# Patient Record
Sex: FEMALE | Race: WHITE | Employment: FULL TIME | URBAN - METROPOLITAN AREA
[De-identification: names, ages, dates, MRNs, and addresses within clinical notes are randomized per-mention and may not be internally consistent; named-entity substitution may affect disease eponyms.]

---

## 2017-03-21 ENCOUNTER — OFFICE VISIT (OUTPATIENT)
Dept: SURGERY | Age: 42
End: 2017-03-21

## 2017-03-21 VITALS
HEART RATE: 74 BPM | WEIGHT: 118 LBS | OXYGEN SATURATION: 99 % | SYSTOLIC BLOOD PRESSURE: 136 MMHG | DIASTOLIC BLOOD PRESSURE: 86 MMHG | RESPIRATION RATE: 20 BRPM | BODY MASS INDEX: 20.14 KG/M2 | HEIGHT: 64 IN

## 2017-03-21 DIAGNOSIS — R10.31 GROIN PAIN, RIGHT: Primary | ICD-10-CM

## 2017-03-21 RX ORDER — IBUPROFEN 600 MG/1
600 TABLET ORAL
Qty: 21 TAB | Refills: 0 | Status: SHIPPED | OUTPATIENT
Start: 2017-03-21 | End: 2019-01-25 | Stop reason: DRUGHIGH

## 2017-03-28 ENCOUNTER — TELEPHONE (OUTPATIENT)
Dept: SURGERY | Age: 42
End: 2017-03-28

## 2017-04-04 NOTE — TELEPHONE ENCOUNTER
Spoke to Mrs. David Canas. .She is using the moist heat and Advil. She reports she is having pain  From the groin across the top of her thigh and hip(sometimes it is shooting). Will get back with her in the next few days after discussing with Dr. Kaden Wallis. Patient fine with plan.

## 2017-04-10 DIAGNOSIS — K40.90 RIGHT INGUINAL HERNIA: Primary | ICD-10-CM

## 2017-04-10 NOTE — TELEPHONE ENCOUNTER
Spoke to patient. Dr. Kaden Wallis wants ultrasound done at [facility]. Apt is scheduled for 4/12. Dr. Kaden Wallis will call her next week with results. Due to him out of town. Patient is fine with plan.

## 2017-04-28 NOTE — PROGRESS NOTES
To: Shanel Sibley MD    From: Scotty Acharya MD    Thank you for sending Bonnie Meyer to see us. Encounter Date: 3/21/2017  History and Physical    Assessment:   No evidence of hernia. Possible tender lymph node. Body mass index is 20.25 kg/(m^2). Plan:   Ibuprofen 600mg (4 over-the-counter tablets) three times daily with food. Ice area 20 minutes at a time as often as possible. Can do 20 minutes every hour. Refrain from exercise. May walk, but nothing more. Do no lift>10lbs. Follow this regimen for 2 weeks. Call us in 2 weeks to let us know if:  1. Its all better -- ease back into exercise. Avoid abdominal work-outs another month. 2. Its almost better -- refrain from exercise another month. 3. Its no better -- call to have groin ultrasound arranged. 4. Its worse -- call to make follow-up appointment. HPI:   Delbert Verma is a 36 y.o. female who is seen in consultation at the request of Shanel Sibley MD for groin pain. Symptoms were first noted 3 weeks ago. Pain is described as sharp and at times severe. Patient has a bulge. Bulge is not reducible. Patient does not have urinary symptoms. Patient does not have difficulty with bowel movements. Patient does not have nausea or vomiting. Patient has history of previous hernia surgery -- [place].      Past Medical History:   Diagnosis Date    Diabetes ([address]     Erythema nodosum     Thyroid disease     hyper, now hypo per pt     Past Surgical History:   Procedure Laterality Date    HX CARPAL TUNNEL RELEASE      bilateral    HX GYN      endometrial ablation    HX HEENT      right eye surgery    HX HERNIA REPAIR      left       Family History   Problem Relation Age of Onset    Thyroid Disease Mother     Arthritis-osteo Father     Asthma Son       Social History   Substance Use Topics    Smoking status: Never Smoker    Smokeless tobacco: Not on file    Alcohol use No      Current Outpatient Prescriptions   Medication Sig    ibuprofen (MOTRIN) 600 mg tablet Take 1 Tab by mouth three (3) times daily (with meals).  gabapentin (NEURONTIN) 300 mg tablet Take 300 mg by mouth nightly.  insulin lispro (HUMALOG) 100 unit/mL injection by SubCUTAneous route. Basil rate:  MN-7a-0.6units/hr  7a-1430pm-0.625units/hr  9781-9300-0.35units/hr  1830-MN-0.6units/hr    levothyroxine (SYNTHROID) 137 mcg tablet Take 150 mcg by mouth every other day.  PV W-O BRYAN/FERROUS FUMARATE/FA (M-VIT PO) Take 1 Tab by mouth daily. No current facility-administered medications for this visit. Allergies: Allergies   Allergen Reactions    Codeine Other (comments)     Pass out    Pcn [Penicillins] Unknown (comments)    Sulfa (Sulfonamide Antibiotics) Rash        Review of Systems:  10 systems reviewed. See scanned sheet in \"Media\" section. See HPI for pertinent positives and negatives. Objective:     Visit Vitals    /86    Pulse 74    Resp 20    Ht 5' 4\" (1.626 m)    Wt 53.5 kg (118 lb)    SpO2 99%    BMI 20.25 kg/m2       Physical Exam:  General appearance  Alert, cooperative, no distress, appears stated age   HEENT Anicteric   Neck Supple   Back   No CVA tenderness   Lungs   Clear to auscultation bilaterally   Heart  Regular rate and rhythm. No murmur, rub or gallop   Abdomen   Soft. Bowel sounds normal. No organomegaly. Tender LN on US. No hernia.      Extremities no cyanosis or edema   Pulses 2+ right radial   Skin Skin color, texture, turgor normal.   Lymph nodes Inguinal nodes normal.   Neurologic Without overt sensory or motor deficit     Signed By: Jass Montero MD     April 28, 2017

## 2017-08-21 ENCOUNTER — HOSPITAL ENCOUNTER (OUTPATIENT)
Dept: MAMMOGRAPHY | Age: 42
Discharge: HOME | End: 2017-08-21
Attending: OBSTETRICS & GYNECOLOGY
Payer: COMMERCIAL

## 2017-08-21 DIAGNOSIS — Z12.31 VISIT FOR SCREENING MAMMOGRAM: ICD-10-CM

## 2017-08-21 PROCEDURE — 77063 BREAST TOMOSYNTHESIS BI: CPT

## 2017-08-23 NOTE — TELEPHONE ENCOUNTER
Following up on patient care. Patient was scheduled form ultrasound 4/12. On 4/8/17 patient cancelled ultrasound. Per  patient will call back if she wants to persue.

## 2017-12-13 ENCOUNTER — HOSPITAL ENCOUNTER (OUTPATIENT)
Dept: ULTRASOUND IMAGING | Age: 42
Discharge: HOME | End: 2017-12-13
Payer: COMMERCIAL

## 2017-12-13 ENCOUNTER — HOSPITAL ENCOUNTER (OUTPATIENT)
Dept: MAMMOGRAPHY | Age: 42
Discharge: HOME | End: 2017-12-13
Payer: COMMERCIAL

## 2017-12-13 DIAGNOSIS — N63.20 MASS OF BREAST, LEFT: ICD-10-CM

## 2017-12-13 PROCEDURE — 76642 ULTRASOUND BREAST LIMITED: CPT

## 2017-12-13 PROCEDURE — 77065 DX MAMMO INCL CAD UNI: CPT

## 2017-12-14 NOTE — PROGRESS NOTES
Called coordination of care and spoke with Ibeth, per pt. Request to change time on her appt. From 12/21/2017 @ 1330 to 12/21/2017 @ 0930 a.m.- Changed in system per Ibeth and called pt. And left message on pt.'s phone [phone]) of the change-Ok'd per pt. To leave message on answering system.

## 2017-12-21 ENCOUNTER — HOSPITAL ENCOUNTER (OUTPATIENT)
Dept: ULTRASOUND IMAGING | Age: 42
Discharge: HOME | End: 2017-12-21
Attending: OBSTETRICS & GYNECOLOGY
Payer: COMMERCIAL

## 2017-12-21 VITALS
HEIGHT: 64 IN | RESPIRATION RATE: 20 BRPM | SYSTOLIC BLOOD PRESSURE: 136 MMHG | DIASTOLIC BLOOD PRESSURE: 74 MMHG | TEMPERATURE: 98 F | HEART RATE: 83 BPM | BODY MASS INDEX: 20.14 KG/M2 | WEIGHT: 118 LBS | OXYGEN SATURATION: 99 %

## 2017-12-21 DIAGNOSIS — N63.0 LUMP OR MASS IN BREAST: ICD-10-CM

## 2017-12-21 PROCEDURE — 19000 PUNCTURE ASPIR CYST BREAST: CPT

## 2017-12-21 NOTE — PROGRESS NOTES
Pt ambulated to U/S dept. Without difficulty accomp. By nurse. Pt positioned supine onto U/S stretcher.

## 2017-12-21 NOTE — ROUTINE PROCESS
I have reviewed preprinted discharge instructions with the patient. The patient verbalized understanding. Copy of discharge instructions given to pt., reviewed with pt., signature sheet signed and sent to [facility]. Rec. For scanning r/t penpad not working. Pt stable without c/o pain. Dressing left outer upper breast noted clean/dry/intact without bleeding, swelling or pain noted. Ice pack placed over cyst aspiration site prior to pt. Being discharged. Pt dressed self and ambulated to car for discharge without difficulty.

## 2019-01-25 ENCOUNTER — OFFICE VISIT (OUTPATIENT)
Dept: PRIMARY CARE CLINIC | Age: 44
End: 2019-01-25

## 2019-01-25 VITALS
WEIGHT: 124.2 LBS | TEMPERATURE: 97.9 F | HEART RATE: 77 BPM | HEIGHT: 64 IN | SYSTOLIC BLOOD PRESSURE: 123 MMHG | DIASTOLIC BLOOD PRESSURE: 80 MMHG | OXYGEN SATURATION: 98 % | BODY MASS INDEX: 21.21 KG/M2 | RESPIRATION RATE: 17 BRPM

## 2019-01-25 DIAGNOSIS — S53.401A ELBOW SPRAIN, RIGHT, INITIAL ENCOUNTER: Primary | ICD-10-CM

## 2019-01-25 RX ORDER — IBUPROFEN 600 MG/1
600 TABLET ORAL
Qty: 30 TAB | Refills: 0 | Status: SHIPPED | OUTPATIENT
Start: 2019-01-25 | End: 2020-07-29 | Stop reason: ALTCHOICE

## 2019-01-25 NOTE — PROGRESS NOTES
Chief Complaint   Patient presents with    Elbow Pain     she is a 39y.o. year old female who presents for evalution. She states she was lifting a heavy box down from a shelf at work about 1 week ago, and the box was heavier than she anticipated. The box was about eye level. When the full weight of the box was on her she felt a sharp pain in her right elbow area. There was no redness or swelling, however the area was painful and she had limited ROM. She did not report this as a work injury. I have discussed at length the importance of reporting the injury, however at this time she declines to do so. She has tried to rest the elbow for the past week, however it is still painful at times with full extension. She has tried ibuprofen with temporary relief only. Reviewed PmHx, RxHx, FmHx, SocHx, AllgHx and updated and dated in the chart. Review of Systems - negative except as listed above in the HPI    Objective:     Vitals:    01/25/19 1533   BP: 123/80   Pulse: 77   Resp: 17   Temp: 97.9 °F (36.6 °C)   TempSrc: Oral   SpO2: 98%   Weight: 124 lb 3.2 oz (56.3 kg)   Height: 5' 4\" (1.626 m)       Current Outpatient Medications   Medication Sig    ibuprofen (MOTRIN) 600 mg tablet Take 1 Tab by mouth every six (6) hours as needed for Pain.  gabapentin (NEURONTIN) 300 mg tablet Take 300 mg by mouth nightly.  insulin lispro (HUMALOG) 100 unit/mL injection by SubCUTAneous route. Basil rate:  MN-7a-0.6units/hr  7a-1430pm-0.625units/hr  3986-6264-0.35units/hr  1830-MN-0.6units/hr    levothyroxine (SYNTHROID) 137 mcg tablet Take 150 mcg by mouth every other day.  PV W-O BRYAN/FERROUS FUMARATE/FA (M-VIT PO) Take 1 Tab by mouth daily. No current facility-administered medications for this visit.         Physical Examination: General appearance - alert, well appearing, and in no distress  Mental status - alert, oriented to person, place, and time  Chest - clear to auscultation, no wheezes, rales or rhonchi, symmetric air entry  Heart - normal rate, regular rhythm, normal S1, S2, no murmurs, rubs, clicks or gallops  Musculoskeletal - abnormal exam of right elbow with pain on full extension and lifting of any weight. There is no bruising, swelling, erythema or obvious dislocation of the elbow area. Extremities - peripheral pulses normal, no pedal edema, no clubbing or cyanosis      Assessment/ Plan:    right elbow sprain    Ice, elevate, rest, avoid any lifting and take motrin 600 mg TID for discomfort. If pain continues, see ortho for further evaluation. Follow-up Disposition:  Return if symptoms worsen or fail to improve. I have discussed the diagnosis with the patient and the intended plan as seen in the above orders. The patient has received an after-visit summary and questions were answered concerning future plans. Pt conveyed understanding of plan.     Medication Side Effects and Warnings were discussed with patient      Jorge Alberto Dave NP

## 2019-01-25 NOTE — PROGRESS NOTES
Chief Complaint   Patient presents with    Elbow Pain     Pt c/o right elbow pain x 1 week. Pt has taken Aleve for discomfort.

## 2019-01-25 NOTE — PATIENT INSTRUCTIONS
Elbow Sprain: Care Instructions  Your Care Instructions    An elbow sprain occurs when you overstretch or tear the ligaments around your elbow. Ligaments are the tough tissues that connect one bone to another. A sprain can happen when you fall or when you play sports or do chores around the house. Most sprains will heal with some treatment at home. Follow-up care is a key part of your treatment and safety. Be sure to make and go to all appointments, and call your doctor if you are having problems. It's also a good idea to know your test results and keep a list of the medicines you take. How can you care for yourself at home? · Follow your doctor's directions for wearing a splint, elbow pad, sling, or elastic bandage. Wrapping the elbow may help reduce or prevent swelling. · Rest and protect your elbow. Do not do any activity that hurts your elbow. · Apply ice or a cold pack to your elbow for 10 to 20 minutes at a time to reduce swelling. Try this every 1 to 2 hours for 3 days (when you are awake) or until the swelling goes down. Put a thin cloth between the ice and your skin. · After 2 or 3 days, if your swelling is gone, apply a heating pad on low or a warm cloth to your elbow. This helps keep your arm flexible. Some doctors suggest that you go back and forth between hot and cold. Keep the splint dry. · Prop up your elbow on pillows while you apply ice or anytime you sit or lie down. Try to keep the elbow at or above the level of your heart to help reduce swelling. · Take pain medicines exactly as directed. ? If the doctor gave you a prescription medicine for pain, take it as prescribed. ? If you are not taking a prescription pain medicine, ask your doctor if you can take an over-the-counter medicine. · Return to your usual level of activity slowly. When should you call for help?   Call your doctor now or seek immediate medical care if:    · Your pain is worse.     · You have new or increased swelling in your elbow or hand.     · You cannot bend your arm.     · You have a fever.     · Your elbow looks red.     · You have tingling, weakness, or numbness in your elbow, hand, or fingers.    Watch closely for changes in your health, and be sure to contact your doctor if:    · Your pain is not better after 2 weeks. Where can you learn more? Go to http://janette-mary.info/. Enter Z822 in the search box to learn more about \"Elbow Sprain: Care Instructions. \"  Current as of: September 20, 2018  Content Version: 11.9  © 7582-2868 [employer]. Care instructions adapted under license by [employer] (which disclaims liability or warranty for this information). If you have questions about a medical condition or this instruction, always ask your healthcare professional. [address] any warranty or liability for your use of this information.

## 2019-08-20 ENCOUNTER — HOSPITAL ENCOUNTER (OUTPATIENT)
Dept: MAMMOGRAPHY | Age: 44
Discharge: NURSING, REHAB OR LONG-TERM CARE | End: 2019-08-20
Attending: OBSTETRICS & GYNECOLOGY
Payer: COMMERCIAL

## 2019-08-20 DIAGNOSIS — Z12.39 SCREENING BREAST EXAMINATION: ICD-10-CM

## 2019-08-20 PROCEDURE — 77067 SCR MAMMO BI INCL CAD: CPT

## 2019-08-20 PROCEDURE — 77063 BREAST TOMOSYNTHESIS BI: CPT

## 2020-07-29 ENCOUNTER — OFFICE VISIT (OUTPATIENT)
Dept: PRIMARY CARE CLINIC | Age: 45
End: 2020-07-29

## 2020-07-29 VITALS
SYSTOLIC BLOOD PRESSURE: 133 MMHG | BODY MASS INDEX: 20.88 KG/M2 | HEART RATE: 113 BPM | OXYGEN SATURATION: 99 % | WEIGHT: 122.3 LBS | DIASTOLIC BLOOD PRESSURE: 86 MMHG | HEIGHT: 64 IN | TEMPERATURE: 98.3 F | RESPIRATION RATE: 16 BRPM

## 2020-07-29 DIAGNOSIS — H60.391 OTHER INFECTIVE ACUTE OTITIS EXTERNA OF RIGHT EAR: Primary | ICD-10-CM

## 2020-07-29 DIAGNOSIS — H92.11 EAR DRAINAGE RIGHT: ICD-10-CM

## 2020-07-29 RX ORDER — BISMUTH SUBSALICYLATE 262 MG
1 TABLET,CHEWABLE ORAL DAILY
COMMUNITY

## 2020-07-29 RX ORDER — OFLOXACIN 3 MG/ML
5 SOLUTION AURICULAR (OTIC) 2 TIMES DAILY
Qty: 5 ML | Refills: 0 | Status: SHIPPED | OUTPATIENT
Start: 2020-07-29 | End: 2020-08-05

## 2020-07-29 RX ORDER — MELATONIN
4000 DAILY
COMMUNITY

## 2020-07-29 NOTE — PROGRESS NOTES
HISTORY OF PRESENT ILLNESS  Nithin Bradley is a 54 y.o. female. Patient presents with right ear pain and drainage x3-4 days. Reports pillow is saturated with what was initially clear drainage is now thick yellow. Literally \"drips out of ear. \"  Denies dizziness, fever, hearing loss, trauma. No recent antibiotic use. Was swimming 1 weeks ago. Does have a history of ear tubes. Ear Pain   The history is provided by the patient. The problem has been gradually worsening. Pertinent negatives include no abdominal pain and no headaches. Nothing aggravates the symptoms. Nothing relieves the symptoms. She has tried nothing for the symptoms. Past Medical History:   Diagnosis Date    Breast lump     left breast   2 weeks    Diabetes ([address]     Erythema nodosum     Thyroid disease     hyper, now hypo per pt     Past Surgical History:   Procedure Laterality Date    HX CARPAL TUNNEL RELEASE      bilateral    HX GYN      endometrial ablation    HX HEENT      right eye surgery    HX HERNIA REPAIR      left     MITZY STEREO  BX BREAST RT 1ST LESION W/CLIP AND SPECIMEN Right 2012    negative     Allergies   Allergen Reactions    Codeine Other (comments)     Pass out    Pcn [Penicillins] Unknown (comments)    Sulfa (Sulfonamide Antibiotics) Rash       Review of Systems   Constitutional: Negative for fever and malaise/fatigue. HENT: Negative for sinus pain, sore throat and tinnitus. Eyes: Negative for discharge and redness. Respiratory: Negative for cough. Gastrointestinal: Negative for abdominal pain. Musculoskeletal: Negative for myalgias and neck pain. Neurological: Negative for dizziness and headaches. Physical Exam  Vitals signs reviewed. Constitutional:       Appearance: Normal appearance. HENT:      Head: Normocephalic and atraumatic. Right Ear: Hearing, tympanic membrane and ear canal normal.      Left Ear: Hearing normal. Drainage, swelling and tenderness present.       Ears: Comments: Culture obtained of yellow drainage. Unable to visualize TM for rupture. Tenderness tragus and mastoid  Eyes:      Pupils: Pupils are equal, round, and reactive to light. Neck:      Musculoskeletal: Normal range of motion. Cardiovascular:      Rate and Rhythm: Regular rhythm. Tachycardia present. Pulses: Normal pulses. Pulmonary:      Effort: Pulmonary effort is normal.   Skin:     General: Skin is warm. Neurological:      General: No focal deficit present. Mental Status: She is alert. Psychiatric:         Mood and Affect: Mood normal.         ASSESSMENT and PLAN    ICD-10-CM ICD-9-CM    1. Other infective acute otitis externa of right ear  H60.391 380.10 CULTURE, BODY FLUID W GRAM STAIN   2. Ear drainage right  H92.11 388.60 CULTURE, BODY FLUID W [place]     Encounter Diagnoses   Name Primary?  Other infective acute otitis externa of right ear Yes    Ear drainage right      Orders Placed This Encounter    CULTURE, BODY FLUID W GRAM STAIN    insulin pump (PATIENT SUPPLIED) misc    multivitamin (ONE A DAY) tablet    cholecalciferol (VITAMIN D3) (1000 Units /25 mcg) tablet    ofloxacin (FLOXIN) 0.3 % otic solution   Return 5-7 days if drainage continues so we can visualize for TM rupture  Medication as directed  I have discussed with the patient the diagnosis  and intended plan as seen in the orders. Patient received AVS , all questions answered concerning all future plans. I have discussed medication side effects and warnings with the patient/ guardian. Patient instructed to return of see PCPf symptoms worsen or fail to improve. It was a pleasure to see you in the office today. Please call if you have any further questions or concerns. I am available through the portal system. Signed By: GERRI Chang     July 29, 2020      This note will not be viewable in [address]

## 2020-07-29 NOTE — PATIENT INSTRUCTIONS

## 2020-07-29 NOTE — PROGRESS NOTES
Chief Complaint   Patient presents with    Ear Pain     Patient in room #4 with complaints of pain in right ear with drainage, clear to yellowish

## 2020-07-31 LAB
BACTERIA SPEC AEROBE CULT: ABNORMAL
SPECIMEN STATUS REPORT, ROLRST: NORMAL

## 2020-08-06 ENCOUNTER — OFFICE VISIT (OUTPATIENT)
Dept: PRIMARY CARE CLINIC | Age: 45
End: 2020-08-06
Payer: COMMERCIAL

## 2020-08-06 VITALS
OXYGEN SATURATION: 99 % | RESPIRATION RATE: 15 BRPM | HEIGHT: 64 IN | BODY MASS INDEX: 20.83 KG/M2 | SYSTOLIC BLOOD PRESSURE: 124 MMHG | DIASTOLIC BLOOD PRESSURE: 73 MMHG | WEIGHT: 122 LBS | HEART RATE: 82 BPM | TEMPERATURE: 98.8 F

## 2020-08-06 DIAGNOSIS — H93.8X1 SENSATION OF FULLNESS IN RIGHT EAR: ICD-10-CM

## 2020-08-06 DIAGNOSIS — H60.391 INFECTIVE OTITIS EXTERNA OF RIGHT EAR: ICD-10-CM

## 2020-08-06 PROCEDURE — 99213 OFFICE O/P EST LOW 20 MIN: CPT | Performed by: NURSE PRACTITIONER

## 2020-08-06 RX ORDER — BISMUTH SUBSALICYLATE 262 MG
TABLET,CHEWABLE ORAL
COMMUNITY
Start: 2013-06-17 | End: 2020-08-06

## 2020-08-06 RX ORDER — DOXYCYCLINE 100 MG/1
100 CAPSULE ORAL 2 TIMES DAILY
Qty: 20 CAP | Refills: 0 | Status: SHIPPED | OUTPATIENT
Start: 2020-08-06 | End: 2020-08-16

## 2020-08-06 NOTE — PATIENT INSTRUCTIONS

## 2020-08-06 NOTE — PROGRESS NOTES
Bebo Honeycutt is a 54 y.o. female    Room:4    Chief Complaint   Patient presents with    Ear Drainage     Pt states\" i just want to have my eae checked again i was here last week, right ear, was put on oflaxcin for seven days the drainage stopped but now it feels so clogged that i cant hear out of it, it was still draining through tuesday of this week\". Visit Vitals  /73 (BP 1 Location: Left arm, BP Patient Position: Sitting)   Pulse 82   Temp 98.8 °F (37.1 °C) (Oral)   Resp 15   Ht 5' 4\" (1.626 m)   Wt 122 lb (55.3 kg)   SpO2 99%   BMI 20.94 kg/m²       Pain Scale: 0 - No pain/10    1. Have you been to the ER, urgent care clinic since your last visit? Hospitalized since your last visit?no    2. Have you seen or consulted any other health care providers outside of the [address] [place] [address] since your last visit? Include any pap smears or colon screening.  No

## 2020-08-07 NOTE — PROGRESS NOTES
Subjective:   Andrea Caldera is a 54 y.o. female who complains of right ear fullness following treatment of acute otitis externa last week. She completed course of ofloxacin. Drainage stopped few days ago. Denies any pain. Culture of drainage grew staph aureus and was sensitive to ofloxacin. History of ear issues. Tubes twice - once as child and once as adult. Not currently under care of ENT. Relevant PMH:   Past Medical History:   Diagnosis Date    Breast lump     left breast   2 weeks    Diabetes ([address]     Erythema nodosum     Thyroid disease     hyper, now hypo per pt     Past Surgical History:   Procedure Laterality Date    HX CARPAL TUNNEL RELEASE      bilateral    HX GYN      endometrial ablation    HX HEENT      right eye surgery    HX HERNIA REPAIR      left     MITZY STEREO  BX BREAST RT 1ST LESION W/CLIP AND SPECIMEN Right 2012    negative     Allergies   Allergen Reactions    Codeine Other (comments)     Pass out    Pcn [Penicillins] Unknown (comments)    Sulfa (Sulfonamide Antibiotics) Rash         Review of Systems  Pertinent items are noted in HPI. Objective:     Visit Vitals  /73 (BP 1 Location: Left arm, BP Patient Position: Sitting)   Pulse 82   Temp 98.8 °F (37.1 °C) (Oral)   Resp 15   Ht 5' 4\" (1.626 m)   Wt 122 lb (55.3 kg)   LMP 07/18/2020 (Exact Date)   SpO2 99%   BMI 20.94 kg/m²     General:  alert, cooperative, no distress   Eyes: negative   Ears: lLeft external canal and TM normal.  Right external canal appears normal and TM is gray, intact but distorted. Sinuses: Normal paranasal sinuses without tenderness   Mouth:  Lips, mucosa, and tongue normal. Teeth and gums normal and normal findings: oropharynx pink & moist without lesions or evidence of thrush   Neck: supple, symmetrical, trachea midline and no adenopathy. Assessment/Plan:       ICD-10-CM ICD-9-CM    1. Infective otitis externa of right ear  H60.391 380.10    2.  Sensation of fullness in right ear H93.8X1 388.8        Orders Placed This Encounter    multivitamin (Daily Multi-Vitamin) tablet    omega-3s/dha/epa/fish oil/D3 (VITAMIN-D + OMEGA-3 PO)    doxycycline (MONODOX) 100 mg capsule       Per orders. F/u with ENT ([address] [facility] or hers) if persistent or worsening symptoms. Min Bee NP  This note will not be viewable in [address]

## 2020-08-21 ENCOUNTER — HOSPITAL ENCOUNTER (OUTPATIENT)
Dept: MAMMOGRAPHY | Age: 45
Discharge: HOME | End: 2020-08-21
Attending: OBSTETRICS & GYNECOLOGY
Payer: COMMERCIAL

## 2020-08-21 DIAGNOSIS — Z12.31 VISIT FOR SCREENING MAMMOGRAM: ICD-10-CM

## 2020-08-21 PROCEDURE — 77063 BREAST TOMOSYNTHESIS BI: CPT

## 2021-08-09 ENCOUNTER — TRANSCRIBE ORDER (OUTPATIENT)
Dept: SCHEDULING | Age: 46
End: 2021-08-09

## 2021-08-09 DIAGNOSIS — Z12.31 VISIT FOR SCREENING MAMMOGRAM: Primary | ICD-10-CM

## 2021-08-24 ENCOUNTER — HOSPITAL ENCOUNTER (OUTPATIENT)
Dept: MAMMOGRAPHY | Age: 46
Discharge: HOME | End: 2021-08-24
Attending: OBSTETRICS & GYNECOLOGY
Payer: COMMERCIAL

## 2021-08-24 DIAGNOSIS — Z12.31 VISIT FOR SCREENING MAMMOGRAM: ICD-10-CM

## 2021-08-24 PROCEDURE — 77063 BREAST TOMOSYNTHESIS BI: CPT

## 2021-08-26 ENCOUNTER — HOSPITAL ENCOUNTER (OUTPATIENT)
Dept: MAMMOGRAPHY | Age: 46
Discharge: HOME | End: 2021-08-26
Attending: OBSTETRICS & GYNECOLOGY
Payer: COMMERCIAL

## 2021-08-26 DIAGNOSIS — R92.8 ABNORMAL MAMMOGRAM: ICD-10-CM

## 2021-08-26 PROCEDURE — 77061 BREAST TOMOSYNTHESIS UNI: CPT

## 2021-08-26 PROCEDURE — 76642 ULTRASOUND BREAST LIMITED: CPT

## 2021-09-15 ENCOUNTER — TRANSCRIBE ORDER (OUTPATIENT)
Dept: SCHEDULING | Age: 46
End: 2021-09-15

## 2021-09-15 DIAGNOSIS — R92.2 BREAST DENSITY: Primary | ICD-10-CM

## 2021-09-17 ENCOUNTER — HOSPITAL ENCOUNTER (OUTPATIENT)
Dept: MAMMOGRAPHY | Age: 46
Discharge: HOME | End: 2021-09-17
Attending: OBSTETRICS & GYNECOLOGY
Payer: COMMERCIAL

## 2021-09-17 DIAGNOSIS — R92.2 BREAST DENSITY: ICD-10-CM

## 2021-09-17 PROCEDURE — 19000 PUNCTURE ASPIR CYST BREAST: CPT

## 2021-09-17 PROCEDURE — 76642 ULTRASOUND BREAST LIMITED: CPT

## 2021-09-17 PROCEDURE — 74011000250 HC RX REV CODE- 250: Performed by: RADIOLOGY

## 2021-09-17 RX ORDER — LIDOCAINE HYDROCHLORIDE 10 MG/ML
10 INJECTION INFILTRATION; PERINEURAL
Status: COMPLETED | OUTPATIENT
Start: 2021-09-17 | End: 2021-09-17

## 2021-09-17 RX ADMIN — LIDOCAINE HYDROCHLORIDE 10 ML: 10 INJECTION, SOLUTION INFILTRATION; PERINEURAL at 14:05

## 2021-09-17 NOTE — PROGRESS NOTES
Following procedure, no bruising or bleeding. Pt given verbal discharge instructions to keep bandaid dry. She can remove it tonight. She was given an icepack to use if needed.

## 2021-10-21 ENCOUNTER — OFFICE VISIT (OUTPATIENT)
Dept: PRIMARY CARE CLINIC | Age: 46
End: 2021-10-21

## 2021-10-21 VITALS
TEMPERATURE: 98.1 F | BODY MASS INDEX: 20.32 KG/M2 | WEIGHT: 119 LBS | RESPIRATION RATE: 16 BRPM | OXYGEN SATURATION: 98 % | HEART RATE: 84 BPM | HEIGHT: 64 IN | DIASTOLIC BLOOD PRESSURE: 82 MMHG | SYSTOLIC BLOOD PRESSURE: 124 MMHG

## 2021-10-21 DIAGNOSIS — H66.91 RIGHT ACUTE OTITIS MEDIA: Primary | ICD-10-CM

## 2021-10-21 DIAGNOSIS — Z20.822 ENCOUNTER FOR LABORATORY TESTING FOR COVID-19 VIRUS: ICD-10-CM

## 2021-10-21 LAB — SARS-COV-2 POC: NEGATIVE

## 2021-10-21 PROCEDURE — 87426 SARSCOV CORONAVIRUS AG IA: CPT | Performed by: NURSE PRACTITIONER

## 2021-10-21 PROCEDURE — 99213 OFFICE O/P EST LOW 20 MIN: CPT | Performed by: NURSE PRACTITIONER

## 2021-10-21 RX ORDER — INSULIN ASPART 100 [IU]/ML
INJECTION, SOLUTION INTRAVENOUS; SUBCUTANEOUS
COMMUNITY
Start: 2021-09-03

## 2021-10-21 RX ORDER — AZITHROMYCIN 250 MG/1
TABLET, FILM COATED ORAL
Qty: 6 TABLET | Refills: 0 | Status: SHIPPED | OUTPATIENT
Start: 2021-10-21 | End: 2022-04-16 | Stop reason: ALTCHOICE

## 2021-10-21 NOTE — PROGRESS NOTES
RM 6    Chief Complaint   Patient presents with    Ear Pain     both ears x 2 days- right ear drainage       Visit Vitals  /82 (BP 1 Location: Right arm, BP Patient Position: Sitting)   Pulse 84   Temp 98.1 °F (36.7 °C) (Oral)   Resp 16   Ht 5' 4\" (1.626 m)   Wt 119 lb (54 kg)   SpO2 98%   BMI 20.43 kg/m²

## 2021-10-21 NOTE — PROGRESS NOTES
Subjective:   Guillermo Polk presents for evaluation of Ear Pain (both ears x 2 days- right ear drainage)     This started a few days ago, and is gradually worsening since that time. She also reports right ear pain, discharge, pressure. She reports crackling in left ear. She denies a history of: fever, sinus congestion, sore throat and dry cough. Treatments have included: none. Relevant PMH: No pertinent additional PMH. Patient reports sick contacts: no    /82 (BP 1 Location: Right arm, BP Patient Position: Sitting)   Pulse 84   Temp 98.1 °F (36.7 °C) (Oral)   Resp 16   Ht 5' 4\" (1.626 m)   Wt 119 lb (54 kg)   LMP 09/26/2021   SpO2 98%   BMI 20.43 kg/m²     Physical Exam  General: alert, cooperative, no distress, appears stated age  Eye exam: negative  Ear exam: abnormal external canal AD - erythematous, abnormal external canal AS - ceruminosis impacting canal  Sinus exam: Normal paranasal sinuses without tenderness  Oropharynx exam: Lips, mucosa, and tongue normal. Teeth and gums normal and normal findings: oropharynx pink & moist without lesions or evidence of thrush  Neck exam: supple, symmetrical, trachea midline and no adenopathy  Ear exam: correction: external canal AD normal; TM - erythematous, bulging, purulent material behind TM    Assessment/Plan:   1. Right acute otitis media  2. Encounter for laboratory testing for COVID-19 virus  -     AMB POC SARS-COV-2    Orders Placed This Encounter    AMB POC SARS-COV-2 ANTIGEN    NovoLOG U-100 Insulin aspart 100 unit/mL injection    azithromycin (ZITHROMAX) 250 mg tablet     Pt states PCN allergic and possible cephalosporin allergy. The above diagnosis is a new problem. We discussed expected course, resolution, and complications of diagnosis in detail. No follow-ups on file. An electronic signature was used to authenticate this note.   -- Raheel Grimes NP

## 2021-10-21 NOTE — PATIENT INSTRUCTIONS
Ear Infection (Otitis Media): Care Instructions  Overview     An ear infection may start with a cold and affect the middle ear (otitis media). It can hurt a lot. Most ear infections clear up on their own in a couple of days and do not need antibiotics. Also, antibiotics do not work against viruses, which may be the cause of your infection. Regular doses of pain relievers are the best way to reduce your fever and help you feel better. Follow-up care is a key part of your treatment and safety. Be sure to make and go to all appointments, and call your doctor if you are having problems. It's also a good idea to know your test results and keep a list of the medicines you take. How can you care for yourself at home? · Take pain medicines exactly as directed. ? If the doctor gave you a prescription medicine for pain, take it as prescribed. ? If you are not taking a prescription pain medicine, take an over-the-counter medicine, such as acetaminophen (Tylenol), ibuprofen (Advil, Motrin), or naproxen (Aleve). Read and follow all instructions on the label. ? Do not take two or more pain medicines at the same time unless the doctor told you to. Many pain medicines have acetaminophen, which is Tylenol. Too much acetaminophen (Tylenol) can be harmful. · Plan to take a full dose of pain reliever before bedtime. Getting enough sleep will help you get better. · Try a warm, moist washcloth on the ear. It may help relieve pain. · If your doctor prescribed antibiotics, take them as directed. Do not stop taking them just because you feel better. You need to take the full course of antibiotics. When should you call for help? Call your doctor now or seek immediate medical care if:    · You have new or increasing ear pain.     · You have new or increasing pus or blood draining from your ear.     · You have a fever with a stiff neck or a severe headache.    Watch closely for changes in your health, and be sure to contact your doctor if:    · You have new or worse symptoms.     · You are not getting better after taking an antibiotic for 2 days. Where can you learn more? Go to http://www.gray.com/  Enter [id number] in the search box to learn more about \"Ear Infection (Otitis Media): Care Instructions. \"  Current as of: December 2, 2020               Content Version: 13.0  © 2006-2021 [employer]. Care instructions adapted under license by [employer] (which disclaims liability or warranty for this information). If you have questions about a medical condition or this instruction, always ask your healthcare professional. [address] any warranty or liability for your use of this information.

## 2022-04-16 ENCOUNTER — OFFICE VISIT (OUTPATIENT)
Dept: PRIMARY CARE CLINIC | Age: 47
End: 2022-04-16

## 2022-04-16 VITALS
DIASTOLIC BLOOD PRESSURE: 85 MMHG | BODY MASS INDEX: 20.91 KG/M2 | TEMPERATURE: 98.1 F | WEIGHT: 118 LBS | HEART RATE: 95 BPM | SYSTOLIC BLOOD PRESSURE: 136 MMHG | HEIGHT: 63 IN | RESPIRATION RATE: 17 BRPM | OXYGEN SATURATION: 97 %

## 2022-04-16 DIAGNOSIS — L02.11 CUTANEOUS ABSCESS OF NECK: Primary | ICD-10-CM

## 2022-04-16 PROCEDURE — 99213 OFFICE O/P EST LOW 20 MIN: CPT | Performed by: NURSE PRACTITIONER

## 2022-04-16 RX ORDER — BACITRACIN ZINC 500 UNIT/G
OINTMENT (GRAM) TOPICAL 2 TIMES DAILY
Qty: 15 G | Refills: 0 | Status: SHIPPED | OUTPATIENT
Start: 2022-04-16

## 2022-04-16 RX ORDER — CEPHALEXIN 500 MG/1
500 CAPSULE ORAL 4 TIMES DAILY
Qty: 28 CAPSULE | Refills: 0 | Status: SHIPPED | OUTPATIENT
Start: 2022-04-16 | End: 2022-04-23

## 2022-04-16 NOTE — PATIENT INSTRUCTIONS
Skin Abscess: Care Instructions  Overview     A skin abscess is a bacterial infection that forms a pocket of pus. A boil is a kind of skin abscess. The doctor may have cut an opening in the abscess so that the pus can drain out. You may have gauze in the cut so that the abscess will stay open and keep draining. You may need antibiotics. You will need to follow up with your doctor to make sure the infection has gone away. The doctor has checked you carefully, but problems can develop later. If you notice any problems or new symptoms, get medical treatment right away. Follow-up care is a key part of your treatment and safety. Be sure to make and go to all appointments, and call your doctor if you are having problems. It's also a good idea to know your test results and keep a list of the medicines you take. How can you care for yourself at home? · Apply warm and dry compresses, a heating pad set on low, or a hot water bottle 3 or 4 times a day for pain. Keep a cloth between the heat source and your skin. · If your doctor prescribed antibiotics, take them as directed. Do not stop taking them just because you feel better. You need to take the full course of antibiotics. · Take pain medicines exactly as directed. ? If the doctor gave you a prescription medicine for pain, take it as prescribed. ? If you are not taking a prescription pain medicine, ask your doctor if you can take an over-the-counter medicine. · Keep your bandage clean and dry. Change the bandage whenever it gets wet or dirty, or at least one time a day. · If the abscess was packed with gauze:  ? Keep follow-up appointments to have the gauze changed or removed. If the doctor instructed you to remove the gauze, follow the instructions you were given for how to remove it. ? After the gauze is removed, soak the area in warm water for 15 to 20 minutes 2 times a day, until the wound closes. When should you call for help?    Call your doctor now or seek immediate medical care if:    · You have signs of worsening infection, such as:  ? Increased pain, swelling, warmth, or redness. ? Red streaks leading from the infected skin. ? Pus draining from the wound. ? A fever. Watch closely for changes in your health, and be sure to contact your doctor if:    · You do not get better as expected. Where can you learn more? Go to http://www.gray.com/  Enter X562 in the search box to learn more about \"Skin Abscess: Care Instructions. \"  Current as of: November 15, 2021               Content Version: 13.2  © 3284-3546 [employer]. Care instructions adapted under license by [employer] (which disclaims liability or warranty for this information). If you have questions about a medical condition or this instruction, always ask your healthcare professional. [address] any warranty or liability for your use of this information.

## 2022-04-16 NOTE — PROGRESS NOTES
HISTORY OF PRESENT ILLNESS  aHnnah Sutton is a 49 y.o. female. HPI  Chief Complaint   Patient presents with    Ear Drainage     behind right ear . .sunday hurting tender. monday lump. Savannah Lever during week progressed. hot compresses made it pop yellowish blood drainage and blood came out yesterday     Pt presents with complaints of painful bump behind right ear for 5 days. Started after hair color treatment. Bump spontaneously drainage some bloody pus. Feels well otherwise. Denies any fevers, chills, N/V, sore throat. Tried warm compresses with minimal relief. Past Medical History:   Diagnosis Date    Breast lump     left breast   2 weeks    Diabetes ([address]     Erythema nodosum     Thyroid disease     hyper, now hypo per pt     Past Surgical History:   Procedure Laterality Date    HX CARPAL TUNNEL RELEASE      bilateral    HX CYST INCISION AND DRAINAGE Left     age 35?  HX GYN      endometrial ablation    HX HEENT      right eye surgery    HX HERNIA REPAIR      left     MITZY STEREO  BX BREAST RT 1ST LESION W/CLIP AND SPECIMEN Right 2012    negative     Allergies   Allergen Reactions    Codeine Other (comments)     Pass out    Pcn [Penicillins] Unknown (comments)    Sulfa (Sulfonamide Antibiotics) Rash       ROS  A comprehensive review of systems was obtained and negative except findings in the HPI    Physical Exam  Constitutional:       General: She is not in acute distress. Appearance: Normal appearance. She is not ill-appearing or toxic-appearing. HENT:      Head:        Comments: <1cm abscess behind right ear, draining purulent fluid. Mild erythema surrounding abscess with tenderness. Nose: Nose normal.      Mouth/Throat:      Mouth: Mucous membranes are moist.      Pharynx: Oropharynx is clear. Eyes:      Extraocular Movements: Extraocular movements intact. Conjunctiva/sclera: Conjunctivae normal.      Pupils: Pupils are equal, round, and reactive to light.    Musculoskeletal: Cervical back: Normal range of motion and neck supple. No rigidity or tenderness. Neurological:      Mental Status: She is alert. ASSESSMENT and PLAN    ICD-10-CM ICD-9-CM    1. Cutaneous abscess of neck  L02.11 682.1      Orders Placed This Encounter    cephALEXin (KEFLEX) 500 mg capsule     Sig: Take 1 Capsule by mouth four (4) times daily for 7 days. Dispense:  28 Capsule     Refill:  0    bacitracin zinc (BACITRACIN) ointment     Sig: Apply  to affected area two (2) times a day. Dispense:  15 g     Refill:  0     Treatment per orders. Follow-up if no improvement and prn.     Rodney Arellano NP

## 2023-01-26 ENCOUNTER — TRANSCRIBE ORDER (OUTPATIENT)
Dept: SCHEDULING | Age: 48
End: 2023-01-26

## 2023-01-26 ENCOUNTER — HOSPITAL ENCOUNTER (OUTPATIENT)
Dept: CT IMAGING | Age: 48
Discharge: HOME | End: 2023-01-26
Attending: FAMILY MEDICINE
Payer: COMMERCIAL

## 2023-01-26 DIAGNOSIS — R51.9 INTRACTABLE HEADACHE: ICD-10-CM

## 2023-01-26 DIAGNOSIS — S09.90XA HEAD INJURY: ICD-10-CM

## 2023-01-26 DIAGNOSIS — R40.20 LOSS OF CONSCIOUSNESS (HCC): ICD-10-CM

## 2023-01-26 DIAGNOSIS — R40.20 LOSS OF CONSCIOUSNESS (HCC): Primary | ICD-10-CM

## 2023-01-26 PROCEDURE — 70450 CT HEAD/BRAIN W/O DYE: CPT

## 2023-02-17 ENCOUNTER — HOSPITAL ENCOUNTER (INPATIENT)
Age: 48
LOS: 1 days | Discharge: HOME | End: 2023-02-18
Attending: STUDENT IN AN ORGANIZED HEALTH CARE EDUCATION/TRAINING PROGRAM | Admitting: HOSPITALIST
Payer: COMMERCIAL

## 2023-02-17 ENCOUNTER — APPOINTMENT (OUTPATIENT)
Dept: CT IMAGING | Age: 48
End: 2023-02-17
Attending: STUDENT IN AN ORGANIZED HEALTH CARE EDUCATION/TRAINING PROGRAM
Payer: COMMERCIAL

## 2023-02-17 ENCOUNTER — APPOINTMENT (OUTPATIENT)
Dept: MRI IMAGING | Age: 48
End: 2023-02-17
Attending: HOSPITALIST
Payer: COMMERCIAL

## 2023-02-17 ENCOUNTER — APPOINTMENT (OUTPATIENT)
Dept: GENERAL RADIOLOGY | Age: 48
End: 2023-02-17
Attending: STUDENT IN AN ORGANIZED HEALTH CARE EDUCATION/TRAINING PROGRAM
Payer: COMMERCIAL

## 2023-02-17 DIAGNOSIS — R55 SYNCOPE AND COLLAPSE: ICD-10-CM

## 2023-02-17 DIAGNOSIS — R56.9 SEIZURE (HCC): Primary | ICD-10-CM

## 2023-02-17 DIAGNOSIS — R73.9 HYPERGLYCEMIA: ICD-10-CM

## 2023-02-17 LAB
ALBUMIN SERPL-MCNC: 4.5 G/DL (ref 3.5–5)
ALBUMIN/GLOB SERPL: 1.3 (ref 1.1–2.2)
ALP SERPL-CCNC: 89 U/L (ref 45–117)
ALT SERPL-CCNC: 22 U/L (ref 12–78)
ANION GAP SERPL CALC-SCNC: 5 MMOL/L (ref 5–15)
APPEARANCE UR: CLEAR
AST SERPL-CCNC: 12 U/L (ref 15–37)
ATRIAL RATE: 79 BPM
B-OH-BUTYR SERPL-SCNC: 0.92 MMOL/L
BACTERIA URNS QL MICRO: NEGATIVE /HPF
BASE EXCESS BLDV CALC-SCNC: 1 MMOL/L
BASOPHILS # BLD: 0 K/UL (ref 0–0.1)
BASOPHILS NFR BLD: 0 % (ref 0–1)
BILIRUB SERPL-MCNC: 0.7 MG/DL (ref 0.2–1)
BILIRUB UR QL: NEGATIVE
BUN SERPL-MCNC: 14 MG/DL (ref 6–20)
BUN/CREAT SERPL: 15 (ref 12–20)
CALCIUM SERPL-MCNC: 10 MG/DL (ref 8.5–10.1)
CALCULATED P AXIS, ECG09: 74 DEGREES
CALCULATED R AXIS, ECG10: 82 DEGREES
CALCULATED T AXIS, ECG11: 31 DEGREES
CHLORIDE SERPL-SCNC: 100 MMOL/L (ref 97–108)
CO2 SERPL-SCNC: 28 MMOL/L (ref 21–32)
COLOR UR: ABNORMAL
COMMENT, HOLDF: NORMAL
CREAT SERPL-MCNC: 0.95 MG/DL (ref 0.55–1.02)
DIAGNOSIS, 93000: NORMAL
DIFFERENTIAL METHOD BLD: ABNORMAL
EOSINOPHIL # BLD: 0.1 K/UL (ref 0–0.4)
EOSINOPHIL NFR BLD: 1 % (ref 0–7)
EPITH CASTS URNS QL MICRO: ABNORMAL /LPF
ERYTHROCYTE [DISTWIDTH] IN BLOOD BY AUTOMATED COUNT: 11.7 % (ref 11.5–14.5)
GLOBULIN SER CALC-MCNC: 3.6 G/DL (ref 2–4)
GLUCOSE BLD STRIP.AUTO-MCNC: 105 MG/DL (ref 65–117)
GLUCOSE BLD STRIP.AUTO-MCNC: 93 MG/DL (ref 65–117)
GLUCOSE SERPL-MCNC: 354 MG/DL (ref 65–100)
GLUCOSE UR STRIP.AUTO-MCNC: >1000 MG/DL
HCG UR QL: NEGATIVE
HCO3 BLDV-SCNC: 27.5 MMOL/L (ref 23–28)
HCT VFR BLD AUTO: 43.3 % (ref 35–47)
HGB BLD-MCNC: 14 G/DL (ref 11.5–16)
HGB UR QL STRIP: NEGATIVE
IMM GRANULOCYTES # BLD AUTO: 0.1 K/UL (ref 0–0.04)
IMM GRANULOCYTES NFR BLD AUTO: 1 % (ref 0–0.5)
KETONES UR QL STRIP.AUTO: 80 MG/DL
LEUKOCYTE ESTERASE UR QL STRIP.AUTO: NEGATIVE
LYMPHOCYTES # BLD: 0.6 K/UL (ref 0.8–3.5)
LYMPHOCYTES NFR BLD: 10 % (ref 12–49)
MAGNESIUM SERPL-MCNC: 2.1 MG/DL (ref 1.6–2.4)
MCH RBC QN AUTO: 30.5 PG (ref 26–34)
MCHC RBC AUTO-ENTMCNC: 32.3 G/DL (ref 30–36.5)
MCV RBC AUTO: 94.3 FL (ref 80–99)
MONOCYTES # BLD: 0.4 K/UL (ref 0–1)
MONOCYTES NFR BLD: 6 % (ref 5–13)
NEUTS SEG # BLD: 5.2 K/UL (ref 1.8–8)
NEUTS SEG NFR BLD: 82 % (ref 32–75)
NITRITE UR QL STRIP.AUTO: NEGATIVE
NRBC # BLD: 0 K/UL (ref 0–0.01)
NRBC BLD-RTO: 0 PER 100 WBC
P-R INTERVAL, ECG05: 126 MS
PCO2 BLDV: 50.2 MMHG (ref 41–51)
PH BLDV: 7.35 (ref 7.32–7.42)
PH UR STRIP: 5.5 (ref 5–8)
PLATELET # BLD AUTO: 289 K/UL (ref 150–400)
PMV BLD AUTO: 9.8 FL (ref 8.9–12.9)
PO2 BLDV: 33 MMHG (ref 25–40)
POTASSIUM SERPL-SCNC: 4.8 MMOL/L (ref 3.5–5.1)
PROLACTIN SERPL-MCNC: 15.2 NG/ML
PROT SERPL-MCNC: 8.1 G/DL (ref 6.4–8.2)
PROT UR STRIP-MCNC: NEGATIVE MG/DL
Q-T INTERVAL, ECG07: 356 MS
QRS DURATION, ECG06: 84 MS
QTC CALCULATION (BEZET), ECG08: 408 MS
RBC # BLD AUTO: 4.59 M/UL (ref 3.8–5.2)
RBC #/AREA URNS HPF: ABNORMAL /HPF (ref 0–5)
RBC MORPH BLD: ABNORMAL
SAMPLES BEING HELD,HOLD: NORMAL
SAO2 % BLDV: 58.3 % (ref 65–88)
SERVICE CMNT-IMP: ABNORMAL
SERVICE CMNT-IMP: NORMAL
SERVICE CMNT-IMP: NORMAL
SODIUM SERPL-SCNC: 133 MMOL/L (ref 136–145)
SP GR UR REFRACTOMETRY: 1.02 (ref 1–1.03)
SPECIMEN TYPE: ABNORMAL
TROPONIN I SERPL HS-MCNC: 25 NG/L (ref 0–37)
TSH SERPL DL<=0.05 MIU/L-ACNC: 0.42 UIU/ML (ref 0.36–3.74)
UR CULT HOLD, URHOLD: NORMAL
UROBILINOGEN UR QL STRIP.AUTO: 0.2 EU/DL (ref 0.2–1)
VENTRICULAR RATE, ECG03: 79 BPM
WBC # BLD AUTO: 6.4 K/UL (ref 3.6–11)
WBC URNS QL MICRO: ABNORMAL /HPF (ref 0–4)
YEAST BUDDING URNS QL: PRESENT
YEAST URNS QL MICRO: PRESENT

## 2023-02-17 PROCEDURE — 84146 ASSAY OF PROLACTIN: CPT

## 2023-02-17 PROCEDURE — 85025 COMPLETE CBC W/AUTO DIFF WBC: CPT

## 2023-02-17 PROCEDURE — 82010 KETONE BODYS QUAN: CPT

## 2023-02-17 PROCEDURE — 80053 COMPREHEN METABOLIC PANEL: CPT

## 2023-02-17 PROCEDURE — 84484 ASSAY OF TROPONIN QUANT: CPT

## 2023-02-17 PROCEDURE — 95816 EEG AWAKE AND DROWSY: CPT | Performed by: HOSPITALIST

## 2023-02-17 PROCEDURE — 74011250637 HC RX REV CODE- 250/637: Performed by: HOSPITALIST

## 2023-02-17 PROCEDURE — 74011250637 HC RX REV CODE- 250/637: Performed by: STUDENT IN AN ORGANIZED HEALTH CARE EDUCATION/TRAINING PROGRAM

## 2023-02-17 PROCEDURE — 99285 EMERGENCY DEPT VISIT HI MDM: CPT

## 2023-02-17 PROCEDURE — G0378 HOSPITAL OBSERVATION PER HR: HCPCS

## 2023-02-17 PROCEDURE — 95816 EEG AWAKE AND DROWSY: CPT | Performed by: PSYCHIATRY & NEUROLOGY

## 2023-02-17 PROCEDURE — 82803 BLOOD GASES ANY COMBINATION: CPT

## 2023-02-17 PROCEDURE — 81025 URINE PREGNANCY TEST: CPT

## 2023-02-17 PROCEDURE — 65270000046 HC RM TELEMETRY

## 2023-02-17 PROCEDURE — 70450 CT HEAD/BRAIN W/O DYE: CPT

## 2023-02-17 PROCEDURE — 82962 GLUCOSE BLOOD TEST: CPT

## 2023-02-17 PROCEDURE — 81001 URINALYSIS AUTO W/SCOPE: CPT

## 2023-02-17 PROCEDURE — 36415 COLL VENOUS BLD VENIPUNCTURE: CPT

## 2023-02-17 PROCEDURE — 84443 ASSAY THYROID STIM HORMONE: CPT

## 2023-02-17 PROCEDURE — 74011250637 HC RX REV CODE- 250/637: Performed by: NURSE PRACTITIONER

## 2023-02-17 PROCEDURE — 93005 ELECTROCARDIOGRAM TRACING: CPT

## 2023-02-17 PROCEDURE — 70551 MRI BRAIN STEM W/O DYE: CPT

## 2023-02-17 PROCEDURE — 74011250636 HC RX REV CODE- 250/636: Performed by: HOSPITALIST

## 2023-02-17 PROCEDURE — 83735 ASSAY OF MAGNESIUM: CPT

## 2023-02-17 PROCEDURE — 71046 X-RAY EXAM CHEST 2 VIEWS: CPT

## 2023-02-17 RX ORDER — ONDANSETRON 2 MG/ML
4 INJECTION INTRAMUSCULAR; INTRAVENOUS
Status: DISCONTINUED | OUTPATIENT
Start: 2023-02-17 | End: 2023-02-18 | Stop reason: HOSPADM

## 2023-02-17 RX ORDER — IBUPROFEN 200 MG
4 TABLET ORAL AS NEEDED
Status: DISCONTINUED | OUTPATIENT
Start: 2023-02-17 | End: 2023-02-18 | Stop reason: HOSPADM

## 2023-02-17 RX ORDER — LORAZEPAM 0.5 MG/1
1 TABLET ORAL
Status: COMPLETED | OUTPATIENT
Start: 2023-02-17 | End: 2023-02-17

## 2023-02-17 RX ORDER — ONDANSETRON 4 MG/1
4 TABLET, ORALLY DISINTEGRATING ORAL
Status: DISCONTINUED | OUTPATIENT
Start: 2023-02-17 | End: 2023-02-18 | Stop reason: HOSPADM

## 2023-02-17 RX ORDER — ACETAMINOPHEN 325 MG/1
650 TABLET ORAL
Status: COMPLETED | OUTPATIENT
Start: 2023-02-17 | End: 2023-02-17

## 2023-02-17 RX ORDER — GABAPENTIN 300 MG/1
300 CAPSULE ORAL
Status: DISCONTINUED | OUTPATIENT
Start: 2023-02-17 | End: 2023-02-18 | Stop reason: HOSPADM

## 2023-02-17 RX ORDER — SODIUM CHLORIDE 0.9 % (FLUSH) 0.9 %
5-40 SYRINGE (ML) INJECTION EVERY 8 HOURS
Status: DISCONTINUED | OUTPATIENT
Start: 2023-02-17 | End: 2023-02-18 | Stop reason: HOSPADM

## 2023-02-17 RX ORDER — INSULIN LISPRO 100 [IU]/ML
INJECTION, SOLUTION INTRAVENOUS; SUBCUTANEOUS
Status: DISCONTINUED | OUTPATIENT
Start: 2023-02-17 | End: 2023-02-18 | Stop reason: HOSPADM

## 2023-02-17 RX ORDER — ACETAMINOPHEN 650 MG/1
650 SUPPOSITORY RECTAL
Status: DISCONTINUED | OUTPATIENT
Start: 2023-02-17 | End: 2023-02-18 | Stop reason: HOSPADM

## 2023-02-17 RX ORDER — ACETAMINOPHEN 325 MG/1
650 TABLET ORAL
Status: DISCONTINUED | OUTPATIENT
Start: 2023-02-17 | End: 2023-02-18 | Stop reason: HOSPADM

## 2023-02-17 RX ORDER — SODIUM CHLORIDE 9 MG/ML
100 INJECTION, SOLUTION INTRAVENOUS CONTINUOUS
Status: DISPENSED | OUTPATIENT
Start: 2023-02-17 | End: 2023-02-18

## 2023-02-17 RX ORDER — SODIUM CHLORIDE 0.9 % (FLUSH) 0.9 %
5-40 SYRINGE (ML) INJECTION AS NEEDED
Status: DISCONTINUED | OUTPATIENT
Start: 2023-02-17 | End: 2023-02-18 | Stop reason: HOSPADM

## 2023-02-17 RX ORDER — LEVOTHYROXINE SODIUM 50 UG/1
125 TABLET ORAL EVERY OTHER DAY
Status: DISCONTINUED | OUTPATIENT
Start: 2023-02-17 | End: 2023-02-18 | Stop reason: HOSPADM

## 2023-02-17 RX ORDER — LISINOPRIL 5 MG/1
5 TABLET ORAL DAILY
COMMUNITY

## 2023-02-17 RX ORDER — AMITRIPTYLINE HYDROCHLORIDE 10 MG/1
10 TABLET, FILM COATED ORAL
COMMUNITY

## 2023-02-17 RX ORDER — POLYETHYLENE GLYCOL 3350 17 G/17G
17 POWDER, FOR SOLUTION ORAL DAILY PRN
Status: DISCONTINUED | OUTPATIENT
Start: 2023-02-17 | End: 2023-02-18 | Stop reason: HOSPADM

## 2023-02-17 RX ADMIN — GABAPENTIN 300 MG: 300 CAPSULE ORAL at 22:05

## 2023-02-17 RX ADMIN — ACETAMINOPHEN 650 MG: 325 TABLET ORAL at 16:36

## 2023-02-17 RX ADMIN — SODIUM CHLORIDE 100 ML/HR: 9 INJECTION, SOLUTION INTRAVENOUS at 21:15

## 2023-02-17 RX ADMIN — LORAZEPAM 1 MG: 0.5 TABLET ORAL at 21:39

## 2023-02-17 RX ADMIN — LEVOTHYROXINE SODIUM 125 MCG: 0.05 TABLET ORAL at 16:36

## 2023-02-17 NOTE — H&P
History and Physical    Date of Service:  2/17/2023  Primary Care Provider: Edwin Gonzalez MD  Source of information: The patient and Chart review    Chief Complaint: High Blood Sugar  Seizure-like activity    History of Presenting Illness:   Wendy Mcneill is a 49 y.o. female PMH significant for type 1 diabetes managed with insulin pump, hypothyroidism managed with levothyroxine, recently diagnosed hypertension recently started on lisinopril 5 mg daily, fall in January presented to the ED today for a seizure-like activity. The episode happened while her  was trying to change her insulin pump. Patient was standing by the bedside when she clenched up, became stiff and had urinary incontinence. No obvious frothing or tongue bite. No obvious tonic-clonic activity. No previous history of seizure. Following her recent fall she says she is diagnosed with concussion by her PCP. BG was 500 this morning, she had ice cream last night otherwise she has been in fact running hyperglycemic with BG in the 50s the last week. Head CT negative except possible Chiari malformation. Labs significant for hyperglycemia, anion gap 5, CO2 28. Mild hyponatremia sodium 133. High-sensitivity troponin normal.    The patient denies any headache, blurry vision, sore throat, trouble swallowing, trouble with speech, chest pain, SOB, cough, fever, chills, N/V/D, abd pain, urinary symptoms, constipation, recent travels, sick contacts, focal or generalized neurological symptoms, falls, injuries, rashes, contact with COVID-19 diagnosed patients, hematemesis, melena, hemoptysis, hematuria, rashes, denies starting any new medications and denies any other concerns or problems besides as mentioned above. REVIEW OF SYSTEMS:  A comprehensive review of systems was negative except for that written in the History of Present Illness.      Past Medical History:   Diagnosis Date    Breast lump     left breast   2 weeks Diabetes ([facility] [address]     Erythema nodosum     Thyroid disease     hyper, now hypo per pt      Past Surgical History:   Procedure Laterality Date    HX CARPAL TUNNEL RELEASE      bilateral    HX CYST INCISION AND DRAINAGE Left     age 35? HX GYN      endometrial ablation    HX HEENT      right eye surgery    HX HERNIA REPAIR      left     MITZY STEREO  BX BREAST RT 1ST LESION W/CLIP AND SPECIMEN Right 2012    negative     Prior to Admission medications    Medication Sig Start Date End Date Taking? Authorizing Provider   bacitracin zinc (BACITRACIN) ointment Apply  to affected area two (2) times a day. 4/16/22   Gerald Smiles, Paddy Kayser, NP   NovoLOG U-100 Insulin aspart 100 unit/mL injection INJECT 100 UNITS SUBCUTANEOUSLY ONCE A DAY USE WITH PUMP 9/3/21   Provider, Historical   omega-3s/dha/epa/fish oil/D3 (VITAMIN-D + OMEGA-3 PO) Vitamin D  Patient not taking: Reported on 10/21/2021    Provider, Historical   insulin pump (PATIENT SUPPLIED) misc by SubCUTAneous route as needed. Indications: Novolog SSI based on patient carb intake    Provider, Historical   multivitamin (ONE A DAY) tablet Take 1 Tab by mouth daily. Provider, Historical   cholecalciferol (VITAMIN D3) (1000 Units /25 mcg) tablet Take 4,000 Units by mouth daily. Provider, Historical   gabapentin (NEURONTIN) 300 mg tablet Take 300 mg by mouth nightly. Provider, Historical   levothyroxine (SYNTHROID) 137 mcg tablet Take 150 mcg by mouth every other day. Pt states she is taking 126mcg    Provider, Historical     Allergies   Allergen Reactions    Codeine Other (comments)     Pass out    Pcn [Penicillins] Unknown (comments)    Sulfa (Sulfonamide Antibiotics) Rash      Family History   Problem Relation Age of Onset    Thyroid Disease Mother     OSTEOARTHRITIS Father     Asthma Son       Social History:  reports that she has never smoked. She has never used smokeless tobacco. She reports that she does not drink alcohol and does not use drugs.    Social Determinants of Health     Tobacco Use: Low Risk     Smoking Tobacco Use: Never    Smokeless Tobacco Use: Never    Passive Exposure: Not on file   Alcohol Use: Not on file   Financial Resource Strain: Not on file   Food Insecurity: Not on file   Transportation Needs: Not on file   Physical Activity: Not on file   Stress: Not on file   Social Connections: Not on file   Intimate Partner Violence: Not on file   Depression: Not on file   Housing Stability: Not on file        Medications were reconciled to the best of my ability given all available resources at the time of admission. Route is PO if not otherwise noted. Family and social history were personally reviewed, all pertinent and relevant details are outlined as above. Objective:   Visit Vitals  /75   Pulse 88   Temp 97 °F (36.1 °C)   Resp 16   SpO2 99%      O2 Device: None (Room air)    PHYSICAL EXAM:   General: Patient is presently alert oriented x4. HEENT: PEERL, EOMI, moist mucus membranes  Neck: Supple, no JVD, no meningeal signs  Chest: Clear to auscultation bilaterally   CVS: RRR, S1 S2 heard, no murmurs/rubs/gallops  Abd: Soft, non-tender, non-distended, +bowel sounds   Ext: No clubbing, no cyanosis, no edema  Neuro/Psych: Alert orient x4. Nonfocal exam.  Pulses: 2+, symmetric in all extremities  Skin: Warm, dry, without rashes or lesions    Data Review:   I have independently reviewed and interpreted patient's lab and all other diagnostic data    Abnormal Labs Reviewed   CBC WITH AUTOMATED DIFF - Abnormal; Notable for the following components:       Result Value    NEUTROPHILS 82 (*)     LYMPHOCYTES 10 (*)     IMMATURE GRANULOCYTES 1 (*)     ABS. LYMPHOCYTES 0.6 (*)     ABS. IMM.  GRANS. 0.1 (*)     All other components within normal limits   METABOLIC PANEL, COMPREHENSIVE - Abnormal; Notable for the following components:    Sodium 133 (*)     Glucose 354 (*)     AST (SGOT) 12 (*)     All other components within normal limits   URINALYSIS W/MICROSCOPIC - Abnormal; Notable for the following components:    Glucose >1,000 (*)     Ketone 80 (*)     Epithelial cells MODERATE (*)     Yeast PRESENT (*)     Budding yeast PRESENT (*)     All other components within normal limits   BETA-HYDROXYBUTYRATE - Abnormal; Notable for the following components:    B-hydroxybutyrate 0.92 (*)     All other components within normal limits   POC VENOUS BLOOD GAS - Abnormal; Notable for the following components:    sO2, venous (POC) 58.3 (*)     All other components within normal limits       All Micro Results       Procedure Component Value Units Date/Time    URINE CULTURE HOLD SAMPLE [[id number]] Collected: 02/17/23 1140    Order Status: Completed Specimen: Urine Updated: 02/17/23 1144     Urine culture hold       Urine on hold in Microbiology dept for 2 days. If unpreserved urine is submitted, it cannot be used for addtional testing after 24 hours, recollection will be required. IMAGING:   CT HEAD WO CONT   Final Result   Borderline Chiari I malformation. Otherwise no acute abnormality            XR CHEST PA LAT   Final Result   1. No acute cardiopulmonary disease          MRI BRAIN WO CONT    (Results Pending)        ECG/ECHO:    Results for orders placed or performed during the hospital encounter of 02/17/23   EKG, 12 LEAD, INITIAL   Result Value Ref Range    Ventricular Rate 79 BPM    Atrial Rate 79 BPM    P-R Interval 126 ms    QRS Duration 84 ms    Q-T Interval 356 ms    QTC Calculation (Bezet) 408 ms    Calculated P Axis 74 degrees    Calculated R Axis 82 degrees    Calculated T Axis 31 degrees    Diagnosis       Normal sinus rhythm  Right atrial enlargement  Borderline ECG  No previous ECGs available  Confirmed by Tina Pa MD. ([id number]) on 2/17/2023 11:46:39 AM            Notes reviewed from all clinical/nonclinical/nursing services involved in patient's clinical care.  Care coordination discussions were held with appropriate clinical/nonclinical/ nursing providers based on care coordination needs. Assessment:   Given the patient's current clinical presentation, there is a high level of concern for decompensation if discharged from the emergency department. Complex decision making was performed, which includes reviewing the patient's available past medical records, laboratory results, and imaging studies. Active Problems:    Hyperglycemia (2/17/2023)      Seizure-like activity ([address] (2/17/2023)        Plan:   -Admit to the neuro unit. Rumson seizure precautions. Rumson aspiration precautions. Obtain EEG, brain MRI. Consult neurology. Hold off AEDs until seen by neurology. Type II DM on insulin pump  -Patient prefers to continue with her insulin pump  -We will consult DTC  -Order Accu-Cheks, Humalog, hypoglycemic protocol    Hypothyroidism: Continue levothyroxine, recently lowered to 125 mcg  Recently diagnosed hypertension. Reviewed home records, her BP is normal mostly except occasional spikes. I wonder if BP spikes are situational.  Will hold off antihypertensives for now. Continue gabapentin. She takes amitriptyline 10 mg nightly as needed sparingly        DIET: ADULT DIET Regular; 4 carb choices (60 gm/meal)   ISOLATION PRECAUTIONS: There are currently no Active Isolations  CODE STATUS: Full Code   DVT PROPHYLAXIS: SCDs  FUNCTIONAL STATUS PRIOR TO HOSPITALIZATION: Fully active and ambulatory; able to carry on all self-care without restriction. Ambulatory status/function: By self   EARLY MOBILITY ASSESSMENT: Recommend routine ambulation while hospitalized with the assistance of nursing staff  ANTICIPATED DISCHARGE: 24-48 hours.   ANTICIPATED DISPOSITION: Home  EMERGENCY CONTACT/SURROGATE DECISION MAKER:   Extended Emergency Contact Information  Primary Emergency Contact: Ronn Hayes  Address: [address] [place] [address], [address] [place] [address] Phone: [phone]  Relation: Spouse      CRITICAL CARE WAS PERFORMED FOR THIS ENCOUNTER: NO.      Signed By: Rosmery Colvin MD     February 17, 2023         Please note that this dictation may have been completed with Dragon, the computer voice recognition software. Quite often unanticipated grammatical, syntax, homophones, and other interpretive errors are inadvertently transcribed by the computer software. Please disregard these errors. Please excuse any errors that have escaped final proofreading.

## 2023-02-17 NOTE — ED PROVIDER NOTES
54-year-old female with a history of hypertension, diabetes, insulin-dependent who presents to the ED for evaluation of possible seizure happening earlier this morning. Patient reported not feeling well had a blood glucose measurement over 400. Patient was having her infusion device changed when she fell into the bed but this was witnessed by her . He reports that she had a 1 minute tonic-clonic activity and became stiff incontinent and urinated on herself. She denies any fevers. She did have some nausea this morning, denies any vomiting or diarrhea loose stools. Denies pregnancy. No history of seizures in the past.  Patient does have a history of possible TBI about a month ago and had a syncopal episode at that time. Currently being worked up outpatient for cardiac syncope is post to have a Holter monitor. High Blood Sugar   Associated symptoms include nausea. Pertinent negatives include no fever, no vomiting and no chest pain. Past Medical History:   Diagnosis Date    Breast lump     left breast   2 weeks    Diabetes (HCC)     Erythema nodosum     Thyroid disease     hyper, now hypo per pt       Past Surgical History:   Procedure Laterality Date    HX CARPAL TUNNEL RELEASE      bilateral    HX CYST INCISION AND DRAINAGE Left     age 35?     HX GYN      endometrial ablation    HX HEENT      right eye surgery    HX HERNIA REPAIR      left     MITZY STEREO  BX BREAST RT 1ST LESION W/CLIP AND SPECIMEN Right 2012    negative         Family History:   Problem Relation Age of Onset    Thyroid Disease Mother     OSTEOARTHRITIS Father     Asthma Son        Social History     Socioeconomic History    Marital status:      Spouse name: Not on file    Number of children: Not on file    Years of education: Not on file    Highest education level: Not on file   Occupational History    Not on file   Tobacco Use    Smoking status: Never    Smokeless tobacco: Never   Substance and Sexual Activity Alcohol use: No    Drug use: No    Sexual activity: Yes     Partners: Male   Other Topics Concern    Not on file   Social History Narrative    Not on file     Social Determinants of Health     Financial Resource Strain: Not on file   Food Insecurity: Not on file   Transportation Needs: Not on file   Physical Activity: Not on file   Stress: Not on file   Social Connections: Not on file   Intimate Partner Violence: Not on file   Housing Stability: Not on file         ALLERGIES: Codeine, Pcn [penicillins], and Sulfa (sulfonamide antibiotics)    Review of Systems   Constitutional:  Negative for fever. Respiratory:  Negative for shortness of breath. Cardiovascular:  Negative for chest pain. Gastrointestinal:  Positive for nausea. Negative for abdominal pain and vomiting. Neurological:  Positive for seizures. Vitals:    02/17/23 0928   BP: 117/75   Pulse: 88   Resp: 16   Temp: 97 °F (36.1 °C)   SpO2: 99%            Physical Exam  Vitals and nursing note reviewed. Constitutional:       General: She is not in acute distress. Appearance: Normal appearance. She is not ill-appearing. HENT:      Head: Normocephalic and atraumatic. Right Ear: External ear normal.      Left Ear: External ear normal.      Nose: Nose normal.      Mouth/Throat:      Mouth: Mucous membranes are moist.      Pharynx: Oropharynx is clear. Eyes:      Extraocular Movements: Extraocular movements intact. Conjunctiva/sclera: Conjunctivae normal.   Cardiovascular:      Rate and Rhythm: Normal rate and regular rhythm. Pulses: Normal pulses. Heart sounds: Normal heart sounds. Pulmonary:      Effort: Pulmonary effort is normal. No respiratory distress. Breath sounds: Normal breath sounds. No wheezing. Abdominal:      General: Abdomen is flat. Bowel sounds are normal.      Palpations: Abdomen is soft. Tenderness: There is no abdominal tenderness. There is no guarding.    Genitourinary:     Comments: Deferred  Musculoskeletal:         General: No swelling. Normal range of motion. Cervical back: Normal range of motion. Skin:     General: Skin is warm and dry. Capillary Refill: Capillary refill takes less than 2 seconds. Findings: No rash. Neurological:      General: No focal deficit present. Mental Status: She is alert and oriented to person, place, and time. Cranial Nerves: No cranial nerve deficit. Sensory: No sensory deficit. Motor: No weakness. Coordination: Coordination normal.      Comments: Moving all extremities   Psychiatric:         Mood and Affect: Mood normal.         Behavior: Behavior normal.      Comments: Has decision making capacity        Medical Decision Making  Amount and/or Complexity of Data Reviewed  Labs: ordered. Radiology: ordered. ECG/medicine tests: ordered. Procedures    Perfect Serve Consult for Admission  12:35 PM    ED Room Number: VA70/88  Patient Name and age:  Ann Bernard 49 y.o.  female  Working Diagnosis:   1. Seizure ([address]    2. Syncope and collapse    3. Hyperglycemia        COVID-19 Suspicion:  no  Sepsis present:  no  Reassessment needed: no  Code Status:  Full Code  Readmission: no  Isolation Requirements:  no  Recommended Level of Care:  med/surg  Department:[facility] Adult ED - 21   Other: 43-year-old female who presented for syncopal/seizure activity happening earlier today. Previous episode happened about a month ago and she is currently being worked up outpatient supposed to have a cardiac monitor. History of hypertension, TBI possibly, no history of previous seizures. Had about a 1 minute tonic-clonic activity as witnessed by her  today. Has no focal neurologic deficits currently with me. Patient has hyperglycemia, on exam.  No anion gap, CO2 is normal.  Is a diabetic with insulin pump.   Remainder the patient's lab work is reassuring, CT head is normal.  Requires further evaluation management for first-time seizure, EEG and neurology evaluation.

## 2023-02-17 NOTE — ED TRIAGE NOTES
Pt stated her blood sugar was over 400 this am, significant other stated she had a \"seizure\" that lasted a minute at the most, body went stiff, pt incontinent , denies fever, +nausea this am, denies vomiting or diarrhea, some loose stools yesterday, denies pregnancy, pt recently started BP medication and her BP has been running lower , no hx of seizure, pt appears ill

## 2023-02-17 NOTE — ED NOTES
Verbal shift change report given to Odessa Agosto, [facility] (oncoming nurse) by Deepali Decker RN (offgoing nurse). Report included the following information SBAR, Kardex, MAR, Recent Results, and Cardiac Rhythm NSR .

## 2023-02-17 NOTE — CONSULTS
Consult dictated. 79-year-old with insulin-dependent diabetes, presenting after an episode of what appears to be a convulsive syncope. Likely triggered by hyperglycemia and dehydration. Similar episode few weeks ago. Agree with checking EEG and MRI. If both are negative, may discharge home. We discussed precautions and restrictions after such an event including avoiding driving.   Blaire Major MD

## 2023-02-17 NOTE — CONSULTS
[address]    Name:  Kai Palafox  MR#:  [id number]  :  1975  ACCOUNT #:  [de-identified]  DATE OF SERVICE:  2023      REQUESTING PHYSICIAN:  Yaw Sharma MD    REASON FOR EVALUATION:  Seizure-like activity. HISTORY OF PRESENT ILLNESS:  The patient is a 48-year-old female with history of type 1 diabetes with insulin pump in place, hypothyroidism, hypertension, who was recently started on lisinopril in January. She was at home and this morning she noticed that her blood sugar was elevated at 430. She was up several times during the night going to the bathroom. She was standing next to the bed when she started to feel lightheaded, dizzy as if things were spinning. She does not remember anything after that, and as per her , she fell forwards onto the bed, extremities stiffened up and her eyeballs were rolling back and forth. This continued for about a minute, after which she became limp and she slid down. He helped her back onto the bed. She came to relatively quickly and knew what had happened and could answer his questions. She did lose her bladder control during the process. She reports a similar episode a couple of weeks ago when she was in the bathroom and she believes that she fell backwards hitting her head and has been treated for a mild concussion. Currently, she is back to her baseline. She denies any changes in vision or speech, focal motor or sensory deficits in extremities. No chest pain, shortness of breath, palpitations, nausea, or vomiting. PAST MEDICAL HISTORY:  As mentioned above. HOME MEDICATIONS:  1. NovoLog. 2.  Multivitamins. 3.  Gabapentin. 4.  Synthroid. 5.  Lisinopril. ALLERGIES:  CODEINE, PENICILLIN, SULFA DRUGS. FAMILY HISTORY:  Noncontributory. SOCIAL HISTORY:  No smoking. No alcohol use. 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     PHYSICAL EXAMINATION:  GENERAL:  The patient is alert, fully oriented. VITAL SIGNS:  Blood pressure 119/68, temperature 97, pulse is 80. HEART:  Regular rate and rhythm. CHEST:  Clear. ABDOMEN:  Soft, nontender. Positive bowel sounds. EXTREMITIES:  No edema. NEUROLOGIC:  Speech is clear. Comprehension is normal.  Pupils are equal, round, reactive. Extraocular movements are full. Face is symmetric. Tongue is midline. Hearing is baseline. Muscle tone and bulk normal.  Strength normal in both upper and lower extremities. DTRs 2/2 and symmetric. Toes downgoing. Sensation intact. Gait baseline. LABORATORY DATA:  CBC is unremarkable. Chemistry:  Sodium 133, potassium 4.8, BUN 14, creatinine 0.95, glucose is 354. CT of the brain did not show any abnormalities except for borderline Chiari I malformation. ASSESSMENT AND PLAN:  A 41-year-old female with insulin-dependent diabetes, who is presenting after an episode of what appears to be a convulsive syncope. This was likely triggered by hyperglycemia, dehydration, and possibly some hypotension given the fact that she was recently started on an antihypertensive. She had a similar episode a few weeks ago. Agree with checking an EEG and MRI. If both are negative, may discharge home. She should immediately sit or preferably lay down if she starts to feel lightheaded or dizzy. We also discussed precautions and restrictions after such an event including avoiding driving. Please call with any questions. Thank you for this consultation.       Denia Lo MD      [id number]  D:  02/17/2023 15:12  T:  02/17/2023 16:40  JOB #:  [id number]

## 2023-02-18 VITALS
HEART RATE: 86 BPM | OXYGEN SATURATION: 98 % | RESPIRATION RATE: 17 BRPM | DIASTOLIC BLOOD PRESSURE: 85 MMHG | TEMPERATURE: 98.3 F | SYSTOLIC BLOOD PRESSURE: 120 MMHG

## 2023-02-18 LAB
ALBUMIN SERPL-MCNC: 3.6 G/DL (ref 3.5–5)
ALBUMIN/GLOB SERPL: 1.2 (ref 1.1–2.2)
ALP SERPL-CCNC: 61 U/L (ref 45–117)
ALT SERPL-CCNC: 19 U/L (ref 12–78)
ANION GAP SERPL CALC-SCNC: 6 MMOL/L (ref 5–15)
AST SERPL-CCNC: 9 U/L (ref 15–37)
BASOPHILS # BLD: 0 K/UL (ref 0–0.1)
BASOPHILS NFR BLD: 0 % (ref 0–1)
BILIRUB SERPL-MCNC: 0.6 MG/DL (ref 0.2–1)
BUN SERPL-MCNC: 15 MG/DL (ref 6–20)
BUN/CREAT SERPL: 19 (ref 12–20)
CALCIUM SERPL-MCNC: 8.7 MG/DL (ref 8.5–10.1)
CHLORIDE SERPL-SCNC: 104 MMOL/L (ref 97–108)
CO2 SERPL-SCNC: 27 MMOL/L (ref 21–32)
COMMENT, HOLDF: NORMAL
CREAT SERPL-MCNC: 0.81 MG/DL (ref 0.55–1.02)
DIFFERENTIAL METHOD BLD: NORMAL
EOSINOPHIL # BLD: 0.1 K/UL (ref 0–0.4)
EOSINOPHIL NFR BLD: 1 % (ref 0–7)
ERYTHROCYTE [DISTWIDTH] IN BLOOD BY AUTOMATED COUNT: 11.7 % (ref 11.5–14.5)
GLOBULIN SER CALC-MCNC: 2.9 G/DL (ref 2–4)
GLUCOSE BLD STRIP.AUTO-MCNC: 121 MG/DL (ref 65–117)
GLUCOSE SERPL-MCNC: 132 MG/DL (ref 65–100)
HCT VFR BLD AUTO: 36.9 % (ref 35–47)
HGB BLD-MCNC: 12.6 G/DL (ref 11.5–16)
IMM GRANULOCYTES # BLD AUTO: 0 K/UL (ref 0–0.04)
IMM GRANULOCYTES NFR BLD AUTO: 0 % (ref 0–0.5)
LYMPHOCYTES # BLD: 1.3 K/UL (ref 0.8–3.5)
LYMPHOCYTES NFR BLD: 26 % (ref 12–49)
MAGNESIUM SERPL-MCNC: 2.4 MG/DL (ref 1.6–2.4)
MCH RBC QN AUTO: 31.7 PG (ref 26–34)
MCHC RBC AUTO-ENTMCNC: 34.1 G/DL (ref 30–36.5)
MCV RBC AUTO: 92.7 FL (ref 80–99)
MONOCYTES # BLD: 0.5 K/UL (ref 0–1)
MONOCYTES NFR BLD: 9 % (ref 5–13)
NEUTS SEG # BLD: 3.1 K/UL (ref 1.8–8)
NEUTS SEG NFR BLD: 64 % (ref 32–75)
NRBC # BLD: 0 K/UL (ref 0–0.01)
NRBC BLD-RTO: 0 PER 100 WBC
PLATELET # BLD AUTO: 260 K/UL (ref 150–400)
PMV BLD AUTO: 9.7 FL (ref 8.9–12.9)
POTASSIUM SERPL-SCNC: 3.7 MMOL/L (ref 3.5–5.1)
PROT SERPL-MCNC: 6.5 G/DL (ref 6.4–8.2)
RBC # BLD AUTO: 3.98 M/UL (ref 3.8–5.2)
SAMPLES BEING HELD,HOLD: NORMAL
SERVICE CMNT-IMP: ABNORMAL
SODIUM SERPL-SCNC: 137 MMOL/L (ref 136–145)
WBC # BLD AUTO: 4.9 K/UL (ref 3.6–11)

## 2023-02-18 PROCEDURE — 83735 ASSAY OF MAGNESIUM: CPT

## 2023-02-18 PROCEDURE — G0378 HOSPITAL OBSERVATION PER HR: HCPCS

## 2023-02-18 PROCEDURE — 36415 COLL VENOUS BLD VENIPUNCTURE: CPT

## 2023-02-18 PROCEDURE — 80053 COMPREHEN METABOLIC PANEL: CPT

## 2023-02-18 PROCEDURE — 82962 GLUCOSE BLOOD TEST: CPT

## 2023-02-18 PROCEDURE — 85025 COMPLETE CBC W/AUTO DIFF WBC: CPT

## 2023-02-18 NOTE — ED NOTES
Bedside and Verbal shift change report given to Anne Marie Gannon RN (oncoming nurse) by Lloyd Lang RN (offgoing nurse). Report included the following information SBAR, ED Summary, MAR and Recent Results.

## 2023-02-18 NOTE — DISCHARGE SUMMARY
Discharge Summary       PATIENT ID: Madeleine Bridges  MRN: [id number]   YOB: 1975    DATE OF ADMISSION: 2/17/2023 10:00 AM    DATE OF DISCHARGE: 2/18/2023   PRIMARY CARE PROVIDER: Carol Deshpande MD     ATTENDING PHYSICIAN: Dr Arti Richardson  DISCHARGING PROVIDER: Arti Richardson MD      CONSULTATIONS: IP CONSULT TO HOSPITALIST  IP CONSULT TO NEUROLOGY    PROCEDURES/SURGERIES: * No surgery found *    [address] [facility],[address] COURSE:   Probable seizure  -MRI/EEG normal  -Appreciate Neurology, likely convulsive syncope sec to lopressor  -Appreciate discussion with Neurology, ok to discharge home    DM type 1 on insulin pump  Hypothyroidism: on synthroid  HTN: continue home meds      ADDITIONAL CARE RECOMMENDATIONS:   Follow up with PMD   Fall precautions  Please lay down if possible if similar issues    DIET: Diabetic Diet    ACTIVITY: Activity as tolerated and no driving     NOTIFY YOUR PHYSICIAN FOR ANY OF THE FOLLOWING:   Fever over 101 degrees for 24 hours. Chest pain, shortness of breath, fever, chills, nausea, vomiting, diarrhea, change in mentation, falling, weakness, bleeding. Severe pain or pain not relieved by medications, as well as any other signs or symptoms that you may have questions about. FOLLOW UP APPOINTMENTS:    Follow-up Information       Follow up With Specialties Details Why Contact Info    Carol Deshpande MD Family Medicine Follow up in 1 week(s)  [address]  [facility]. [phone]               DISCHARGE MEDICATIONS:  Current Discharge Medication List        CONTINUE these medications which have NOT CHANGED    Details   lisinopriL (PRINIVIL, ZESTRIL) 5 mg tablet Take 5 mg by mouth daily. amitriptyline (ELAVIL) 10 mg tablet Take 10 mg by mouth nightly as needed for Sleep. bacitracin zinc (BACITRACIN) ointment Apply  to affected area two (2) times a day.   Qty: 15 g, Refills: 0      NovoLOG U-100 Insulin aspart 100 unit/mL injection INJECT 100 UNITS SUBCUTANEOUSLY ONCE A DAY USE WITH PUMP      insulin pump (PATIENT SUPPLIED) misc by SubCUTAneous route as needed. Indications: Novolog SSI based on patient carb intake      multivitamin (ONE A DAY) tablet Take 1 Tab by mouth daily. cholecalciferol (VITAMIN D3) (1000 Units /25 mcg) tablet Take 4,000 Units by mouth daily. gabapentin (NEURONTIN) 300 mg tablet Take 300 mg by mouth nightly. levothyroxine (SYNTHROID) 125 mcg tablet Take 125 mcg by mouth every other day.  Pt states she is taking 126mcg           STOP taking these medications       omega-3s/dha/epa/fish oil/D3 (VITAMIN-D + OMEGA-3 PO) Comments:   Reason for Stopping:               DISPOSITION:   x Home With:   OT  PT  HH  RN       Long term SNF/Inpatient Rehab    Independent/assisted living    Hospice    Other:     PATIENT CONDITION AT DISCHARGE:     Functional status    Poor     Deconditioned    x Independent      Cognition    x Lucid     Forgetful     Dementia      Catheters/lines (plus indication)    Cason     PICC     PEG    x None      Code status    x Full code     DNR      PHYSICAL EXAMINATION AT DISCHARGE:  Please see progress note      CHRONIC MEDICAL DIAGNOSES:  Problem List as of 2/18/2023 Date Reviewed: 10/21/2021            Codes Class Noted - Resolved    Hyperglycemia ICD-10-CM: R73.9  ICD-9-CM: 790.29  2/17/2023 - Present        Seizure-like activity ([facility].) ICD-10-CM: R56.9  ICD-9-CM: 780.39  2/17/2023 - Present        Erythema nodosum ICD-10-CM: L52  ICD-9-CM: 695.2  12/26/2012 - Present        Diabetes mellitus type I ([facility].) ICD-10-CM: E10.9  ICD-9-CM: 250.01  12/26/2012 - Present        Hypothyroid ICD-10-CM: E03.9  ICD-9-CM: 244.9  12/26/2012 - Present        Menorrhagia ICD-10-CM: N92.0  ICD-9-CM: 626.2  3/14/2012 - Present           Greater than 39 minutes were spent with the patient on counseling and coordination of care    Signed:   Joce Fay MD  2/18/2023  10:49 AM    .

## 2023-02-18 NOTE — PROCEDURES
[address]   EEG    Name:  Jerica Lua  MR#:  [id number]  :  1975  ACCOUNT #:  [de-identified]  DATE OF SERVICE:  2023    REQUESTING PHYSICIAN:  Mirella Osullivan. Obed Franklin MD    HISTORY:  The patient is a 50-year-old female who has been evaluated after an episode of syncopal episode with seizure-like activity. DESCRIPTION:  This is an 18-channel EEG performed on an awake patient. The dominant posterior background rhythm consists of symmetric very well modulated medium voltage rhythms in the 8-9 Hz frequency range out of the posterior head region. Faster beta frequencies were seen in the frontal areas. Drowsiness and sleep states were not recorded. Photic stimulation elicits symmetric driving response. Hyperventilation did not produce any abnormalities. EEG SUMMARY: Normal study. CLINICAL INTERPRETATION:  This is a normal EEG during wakeful state of the patient. No lateralizing or epileptiform features were noted.       Denia Lo MD      [id number]  D:  2023 15:36  T:  2023 1:15  JOB #:  [id number]

## 2023-02-18 NOTE — PROGRESS NOTES
Hospitalist Progress Note  Yee Joyner MD  Answering service: [phone] OR [phone] from in house phone        Date of Service:  2023  NAME:  Rizwan Yeboah  :  1975  MRN:  [id number]      Admission Summary:   Rizwan Yeboah is a 49 y.o. female PMH significant for type 1 diabetes managed with insulin pump, hypothyroidism managed with levothyroxine, recently diagnosed hypertension recently started on lisinopril 5 mg daily, fall in January presented to the ED today for a seizure-like activity. The episode happened while her  was trying to change her insulin pump. Patient was standing by the bedside when she clenched up, became stiff and had urinary incontinence. No obvious frothing or tongue bite. No obvious tonic-clonic activity. No previous history of seizure. Following her recent fall she says she is diagnosed with concussion by her PCP. BG was 500 this morning, she had ice cream last night otherwise she has been in fact running hyperglycemic with BG in the 50s the last week. Head CT negative except possible Chiari malformation. Labs significant for hyperglycemia, anion gap 5, CO2 28. Mild hyponatremia sodium 133. High-sensitivity troponin normal.     The patient denies any headache, blurry vision, sore throat, trouble swallowing, trouble with speech, chest pain, SOB, cough, fever, chills, N/V/D, abd pain, urinary symptoms, constipation, recent travels, sick contacts, focal or generalized neurological symptoms, falls, injuries, rashes, contact with COVID-19 diagnosed patients, hematemesis, melena, hemoptysis, hematuria, rashes, denies starting any new medications and denies any other concerns or problems besides as mentioned above. Interval history / Subjective:    Follow probable seizure   No new issues  Feeling better  Assessment & Plan:     Probable seizure  -MRI/EEG normal  -Appreciate Neurology, likely convulsive syncope sec to lopressor  -Appreciate discussion with Neurology, ok to discharge home    DM type 1 on insulin pump  Hypothyroidism: on synthroid  HTN: continue home meds         Code status: FULL CODE  Prophylaxis: scd    Plan: discharge home  Care Plan discussed with: patient  Anticipated Disposition: home     Hospital Problems  Date Reviewed: 10/21/2021            Codes Class Noted POA    Hyperglycemia ICD-10-CM: R73.9  ICD-9-CM: 790.29  2/17/2023 Unknown        Seizure-like activity ([facility] ICD-10-CM: R56.9  ICD-9-CM: 780.39  2/17/2023 Unknown               Review of Systems:   A comprehensive review of systems was negative except for that written in the HPI. Vital Signs:    Last 24hrs VS reviewed since prior progress note. Most recent are:  Visit Vitals  /67   Pulse 89   Temp 98.3 °F (36.8 °C)   Resp 20   SpO2 98%       No intake or output data in the 24 hours ending 02/18/23 1042     Physical Examination:     I had a face to face encounter with this patient and independently examined them on 2/18/2023 as outlined below:          General : alert x 3, awake, no acute distress,   HEENT: PEERL, EOMI, moist mucus membrane, TM clear  Neck: supple, no JVD, no meningeal signs  Chest: Clear to auscultation bilaterally   CVS: S1 S2 heard, Capillary refill less than 2 seconds  Abd: soft/ non tender, non distended, BS physiological,   Ext: no clubbing, no cyanosis, no edema, brisk 2+ DP pulses  Neuro/Psych: pleasant mood and affect, CN 2-12 grossly intact, sensory grossly within normal limit, Strength 5/5 in all extremities, DTR 1+ x 4  Skin: warm            Data Review:    Review and/or order of clinical lab test    I have independently reviewed and interpreted patient's lab and all other diagnostic data    Notes reviewed from all clinical/nonclinical/nursing services involved in patient's clinical care.  Care coordination discussions were held with appropriate clinical/nonclinical/ nursing providers based on care coordination needs. Labs:     Recent Labs     02/18/23  0030 02/17/23  0939   WBC 4.9 6.4   HGB 12.6 14.0   HCT 36.9 43.3    289     Recent Labs     02/18/23  0030 02/17/23  1047 02/17/23  0939     --  133*   K 3.7  --  4.8     --  100   CO2 27  --  28   BUN 15  --  14   CREA 0.81  --  0.95   *  --  354*   CA 8.7  --  10.0   MG 2.4 2.1  --      Recent Labs     02/18/23  0030 02/17/23  0939   ALT 19 22   AP 61 89   TBILI 0.6 0.7   TP 6.5 8.1   ALB 3.6 4.5   GLOB 2.9 3.6     No results for input(s): INR, PTP, APTT, INREXT in the last 72 hours. No results for input(s): FE, TIBC, PSAT, FERR in the last 72 hours. No results found for: FOL, RBCF   No results for input(s): PH, PCO2, PO2 in the last 72 hours. No results for input(s): CPK, CKNDX, TROIQ in the last 72 hours.     No lab exists for component: CPKMB  No results found for: CHOL, CHOLX, CHLST, CHOLV, HDL, HDLP, LDL, LDLC, DLDLP, TGLX, TRIGL, TRIGP, CHHD, CHHDX  Lab Results   Component Value Date/Time    Glucose (POC) 121 (H) 02/18/2023 06:38 AM    Glucose (POC) 93 02/17/2023 10:02 PM    Glucose (POC) 105 02/17/2023 04:51 PM    Glucose (POC) 132 (H) 03/16/2012 08:15 AM    Glucose (POC) 118 (H) 03/16/2012 06:42 AM     Lab Results   Component Value Date/Time    Color YELLOW/STRAW 02/17/2023 11:40 AM    Appearance CLEAR 02/17/2023 11:40 AM    Specific gravity 1.020 02/17/2023 11:40 AM    pH (UA) 5.5 02/17/2023 11:40 AM    Protein Negative 02/17/2023 11:40 AM    Glucose >1,000 (A) 02/17/2023 11:40 AM    Ketone 80 (A) 02/17/2023 11:40 AM    Bilirubin Negative 02/17/2023 11:40 AM    Urobilinogen 0.2 02/17/2023 11:40 AM    Nitrites Negative 02/17/2023 11:40 AM    Leukocyte Esterase Negative 02/17/2023 11:40 AM    Epithelial cells MODERATE (A) 02/17/2023 11:40 AM    Bacteria Negative 02/17/2023 11:40 AM    WBC 5-10 02/17/2023 11:40 AM    RBC 0-5 02/17/2023 11:40 AM         Medications Reviewed:     Current Facility-Administered Medications   Medication Dose Route Frequency    sodium chloride (NS) flush 5-40 mL  5-40 mL IntraVENous Q8H    sodium chloride (NS) flush 5-40 mL  5-40 mL IntraVENous PRN    acetaminophen (TYLENOL) tablet 650 mg  650 mg Oral Q6H PRN    Or    acetaminophen (TYLENOL) suppository 650 mg  650 mg Rectal Q6H PRN    polyethylene glycol (MIRALAX) packet 17 g  17 g Oral DAILY PRN    ondansetron (ZOFRAN ODT) tablet 4 mg  4 mg Oral Q8H PRN    Or    ondansetron (ZOFRAN) injection 4 mg  4 mg IntraVENous Q6H PRN    insulin lispro (HUMALOG) injection   SubCUTAneous AC&HS    glucose chewable tablet 16 g  4 Tablet Oral PRN    glucagon (GLUCAGEN) injection 1 mg  1 mg IntraMUSCular PRN    dextrose 10 % infusion 125-250 mL  125-250 mL IntraVENous PRN    gabapentin (NEURONTIN) capsule 300 mg  300 mg Oral QHS    levothyroxine (SYNTHROID) tablet 125 mcg  125 mcg Oral EVERY OTHER DAY     Current Outpatient Medications   Medication Sig    lisinopriL (PRINIVIL, ZESTRIL) 5 mg tablet Take 5 mg by mouth daily. amitriptyline (ELAVIL) 10 mg tablet Take 10 mg by mouth nightly as needed for Sleep. bacitracin zinc (BACITRACIN) ointment Apply  to affected area two (2) times a day. NovoLOG U-100 Insulin aspart 100 unit/mL injection INJECT 100 UNITS SUBCUTANEOUSLY ONCE A DAY USE WITH PUMP    omega-3s/dha/epa/fish oil/D3 (VITAMIN-D + OMEGA-3 PO) Vitamin D (Patient not taking: Reported on 10/21/2021)    insulin pump (PATIENT SUPPLIED) misc by SubCUTAneous route as needed. Indications: Novolog SSI based on patient carb intake    multivitamin (ONE A DAY) tablet Take 1 Tab by mouth daily. cholecalciferol (VITAMIN D3) (1000 Units /25 mcg) tablet Take 4,000 Units by mouth daily. gabapentin (NEURONTIN) 300 mg tablet Take 300 mg by mouth nightly. levothyroxine (SYNTHROID) 125 mcg tablet Take 125 mcg by mouth every other day.  Pt states she is taking 126mcg ______________________________________________________________________  EXPECTED LENGTH OF STAY: - - -  ACTUAL LENGTH OF STAY:          Jaida Haywood MD

## 2023-06-26 ENCOUNTER — TRANSCRIBE ORDERS (OUTPATIENT)
Facility: HOSPITAL | Age: 48
End: 2023-06-26

## 2023-06-26 DIAGNOSIS — Z12.31 ENCOUNTER FOR SCREENING MAMMOGRAM FOR MALIGNANT NEOPLASM OF BREAST: Primary | ICD-10-CM

## 2023-06-28 ENCOUNTER — HOSPITAL ENCOUNTER (OUTPATIENT)
Facility: HOSPITAL | Age: 48
Discharge: HOME | End: 2023-07-01
Attending: OBSTETRICS & GYNECOLOGY
Payer: COMMERCIAL

## 2023-06-28 DIAGNOSIS — Z12.31 ENCOUNTER FOR SCREENING MAMMOGRAM FOR MALIGNANT NEOPLASM OF BREAST: ICD-10-CM

## 2023-06-28 PROCEDURE — 77063 BREAST TOMOSYNTHESIS BI: CPT

## 2023-07-27 NOTE — DISCHARGE INSTRUCTIONS
Discharge Instructions       PATIENT ID: Jody Morales  MRN: [id number]   YOB: 1975    DATE OF ADMISSION: 2/17/2023 10:00 AM    DATE OF DISCHARGE: 2/18/2023    PRIMARY CARE PROVIDER: Mary Dudley MD     ATTENDING PHYSICIAN: Branden Godinez MD  DISCHARGING PROVIDER: Leona Arriola MD    To contact this individual call [phone] and ask the  to page. If unavailable ask to be transferred the Adult Hospitalist Department. DISCHARGE DIAGNOSES Convulsive syncope from Lopressor    CONSULTATIONS: IP CONSULT TO HOSPITALIST  IP CONSULT TO NEUROLOGY    PROCEDURES/SURGERIES: * No surgery found *    PENDING TEST RESULTS:   At the time of discharge the following test results are still pending: none    FOLLOW UP APPOINTMENTS:   Follow-up Information       Follow up With Specialties Details Why Contact Info    Mary Dudley MD Family Medicine Follow up in 1 week(s)  [address]  [facility]  [phone]               ADDITIONAL CARE RECOMMENDATIONS:   Fall precautions  Please lay down if possible if similar issues    DIET: Diabetic Diet    ACTIVITY: Activity as tolerated and no driving till cleared by PMD      DISCHARGE MEDICATIONS:   See Medication Reconciliation Form    It is important that you take the medication exactly as they are prescribed. Keep your medication in the bottles provided by the pharmacist and keep a list of the medication names, dosages, and times to be taken in your wallet. Do not take other medications without consulting your doctor. NOTIFY YOUR PHYSICIAN FOR ANY OF THE FOLLOWING:   Fever over 101 degrees for 24 hours. Chest pain, shortness of breath, fever, chills, nausea, vomiting, diarrhea, change in mentation, falling, weakness, bleeding. Severe pain or pain not relieved by medications. Or, any other signs or symptoms that you may have questions about.       DISPOSITION:  x  Home With:   OT  PT  [facility]  RN       SNF/Inpatient Rehab/LTAC    Independent/assisted living    Hospice    Other:     CDMP Checked:   Yes x     PROBLEM LIST Updated:  Yes x       Signed:   Preston Foster MD  2/18/2023  10:47 AM Thalidomide Counseling: I discussed with the patient the risks of thalidomide including but not limited to birth defects, anxiety, weakness, chest pain, dizziness, cough and severe allergy.

## 2024-06-10 ENCOUNTER — TRANSCRIBE ORDERS (OUTPATIENT)
Facility: HOSPITAL | Age: 49
End: 2024-06-10

## 2024-06-10 DIAGNOSIS — Z12.31 SCREENING MAMMOGRAM FOR HIGH-RISK PATIENT: Primary | ICD-10-CM

## 2024-07-02 ENCOUNTER — HOSPITAL ENCOUNTER (OUTPATIENT)
Facility: HOSPITAL | Age: 49
Discharge: HOME | End: 2024-07-05
Attending: OBSTETRICS & GYNECOLOGY
Payer: COMMERCIAL

## 2024-07-02 VITALS — WEIGHT: 120 LBS | BODY MASS INDEX: 21.26 KG/M2 | HEIGHT: 63 IN

## 2024-07-02 DIAGNOSIS — Z12.31 SCREENING MAMMOGRAM FOR HIGH-RISK PATIENT: ICD-10-CM

## 2024-07-02 PROCEDURE — 77063 BREAST TOMOSYNTHESIS BI: CPT

## 2025-06-05 ENCOUNTER — TRANSCRIBE ORDERS (OUTPATIENT)
Facility: HOSPITAL | Age: 50
End: 2025-06-05

## 2025-06-05 DIAGNOSIS — Z12.31 ENCOUNTER FOR SCREENING MAMMOGRAM FOR HIGH-RISK PATIENT: Primary | ICD-10-CM

## 2025-07-03 ENCOUNTER — HOSPITAL ENCOUNTER (OUTPATIENT)
Facility: HOSPITAL | Age: 50
Discharge: HOME | End: 2025-07-03
Attending: FAMILY MEDICINE
Payer: COMMERCIAL

## 2025-07-03 VITALS — HEIGHT: 63 IN | BODY MASS INDEX: 21.26 KG/M2 | WEIGHT: 120 LBS

## 2025-07-03 DIAGNOSIS — Z12.31 ENCOUNTER FOR SCREENING MAMMOGRAM FOR HIGH-RISK PATIENT: ICD-10-CM

## 2025-07-03 PROCEDURE — 77063 BREAST TOMOSYNTHESIS BI: CPT

## 2025-07-14 ENCOUNTER — HOSPITAL ENCOUNTER (OUTPATIENT)
Facility: HOSPITAL | Age: 50
Discharge: HOME | End: 2025-07-17
Payer: COMMERCIAL

## 2025-07-14 ENCOUNTER — HOSPITAL ENCOUNTER (OUTPATIENT)
Facility: HOSPITAL | Age: 50
Discharge: HOME | End: 2025-07-17
Attending: FAMILY MEDICINE
Payer: COMMERCIAL

## 2025-07-14 VITALS — HEIGHT: 63 IN | BODY MASS INDEX: 21.26 KG/M2 | WEIGHT: 120 LBS

## 2025-07-14 DIAGNOSIS — R92.8 ABNORMAL MAMMOGRAM OF LEFT BREAST: ICD-10-CM

## 2025-07-14 PROCEDURE — 76642 ULTRASOUND BREAST LIMITED: CPT

## 2025-07-14 PROCEDURE — G0279 TOMOSYNTHESIS, MAMMO: HCPCS

## 2025-07-29 ENCOUNTER — HOSPITAL ENCOUNTER (OUTPATIENT)
Facility: HOSPITAL | Age: 50
Discharge: HOME | End: 2025-08-01

## 2025-07-29 LAB
APPEARANCE UR: CLEAR
BACTERIA URNS QL MICRO: ABNORMAL /HPF
BILIRUB UR QL: NEGATIVE
COLOR UR: ABNORMAL
EPITH CASTS URNS QL MICRO: ABNORMAL /LPF
GLUCOSE UR STRIP.AUTO-MCNC: NEGATIVE MG/DL
HGB UR QL STRIP: NEGATIVE
HYALINE CASTS URNS QL MICRO: ABNORMAL /LPF (ref 0–5)
KETONES UR QL STRIP.AUTO: NEGATIVE MG/DL
LEUKOCYTE ESTERASE UR QL STRIP.AUTO: ABNORMAL
NITRITE UR QL STRIP.AUTO: NEGATIVE
PH UR STRIP: 5.5 (ref 5–8)
PROT UR STRIP-MCNC: NEGATIVE MG/DL
RBC #/AREA URNS HPF: ABNORMAL /HPF (ref 0–5)
SP GR UR REFRACTOMETRY: 1.02 (ref 1–1.03)
URINE CULTURE IF INDICATED: ABNORMAL
UROBILINOGEN UR QL STRIP.AUTO: 0.2 EU/DL (ref 0.2–1)
WBC URNS QL MICRO: ABNORMAL /HPF (ref 0–4)

## 2025-07-31 LAB
25(OH)D3 SERPL-MCNC: 31.6 NG/ML (ref 30–100)
ALBUMIN SERPL-MCNC: 3.8 G/DL (ref 3.5–5.2)
ALBUMIN/GLOB SERPL: 1.5 (ref 1.1–2.2)
ALP SERPL-CCNC: 67 U/L (ref 35–104)
ALT SERPL-CCNC: 12 U/L (ref 10–35)
ANION GAP SERPL CALC-SCNC: 12 MMOL/L (ref 2–14)
APPEARANCE UR: CLEAR
AST SERPL-CCNC: 18 U/L (ref 10–35)
BACTERIA SPEC CULT: NORMAL
BACTERIA URNS QL MICRO: ABNORMAL /HPF
BASOPHILS # BLD: 0.03 K/UL (ref 0–0.1)
BASOPHILS NFR BLD: 1.1 % (ref 0–1)
BILIRUB SERPL-MCNC: 0.8 MG/DL (ref 0–1.2)
BILIRUB UR QL: NEGATIVE
BUN SERPL-MCNC: 9 MG/DL (ref 6–20)
BUN/CREAT SERPL: 9 (ref 12–20)
CALCIUM SERPL-MCNC: 9.8 MG/DL (ref 8.6–10)
CHLORIDE SERPL-SCNC: 105 MMOL/L (ref 98–107)
CO2 SERPL-SCNC: 23 MMOL/L (ref 20–29)
COLOR UR: ABNORMAL
COMMENT:: NORMAL
CREAT SERPL-MCNC: 0.95 MG/DL (ref 0.6–1)
CREAT UR-MCNC: 187 MG/DL (ref 28–217)
DIFFERENTIAL METHOD BLD: ABNORMAL
ENDOMYSIUM IGA SER QL: NEGATIVE
EOSINOPHIL # BLD: 0.09 K/UL (ref 0–0.4)
EOSINOPHIL NFR BLD: 3.3 % (ref 0–7)
EPITH CASTS URNS QL MICRO: ABNORMAL /LPF
ERYTHROCYTE [DISTWIDTH] IN BLOOD BY AUTOMATED COUNT: 11.6 % (ref 11.5–14.5)
EST. AVERAGE GLUCOSE BLD GHB EST-MCNC: 131 MG/DL
GLOBULIN SER CALC-MCNC: 2.4 G/DL (ref 2–4)
GLUCOSE SERPL-MCNC: 88 MG/DL (ref 65–100)
GLUCOSE UR STRIP.AUTO-MCNC: NEGATIVE MG/DL
HBA1C MFR BLD: 6.2 % (ref 4–5.6)
HCT VFR BLD AUTO: 40.6 % (ref 35–47)
HGB BLD-MCNC: 13.3 G/DL (ref 11.5–16)
HGB UR QL STRIP: NEGATIVE
HYALINE CASTS URNS QL MICRO: ABNORMAL /LPF (ref 0–5)
IGA SERPL-MCNC: 167 MG/DL (ref 87–352)
IMM GRANULOCYTES # BLD AUTO: 0.01 K/UL (ref 0–0.04)
IMM GRANULOCYTES NFR BLD AUTO: 0.4 % (ref 0–0.5)
KETONES UR QL STRIP.AUTO: NEGATIVE MG/DL
LEUKOCYTE ESTERASE UR QL STRIP.AUTO: ABNORMAL
LYMPHOCYTES # BLD: 1.03 K/UL (ref 0.8–3.5)
LYMPHOCYTES NFR BLD: 37.3 % (ref 12–49)
MCH RBC QN AUTO: 31.7 PG (ref 26–34)
MCHC RBC AUTO-ENTMCNC: 32.8 G/DL (ref 30–36.5)
MCV RBC AUTO: 96.7 FL (ref 80–99)
MICROALBUMIN UR-MCNC: <1.2 MG/DL
MICROALBUMIN/CREAT UR-RTO: NORMAL MG/G
MONOCYTES # BLD: 0.31 K/UL (ref 0–1)
MONOCYTES NFR BLD: 11.2 % (ref 5–13)
NEUTS SEG # BLD: 1.29 K/UL (ref 1.8–8)
NEUTS SEG NFR BLD: 46.7 % (ref 32–75)
NITRITE UR QL STRIP.AUTO: NEGATIVE
NRBC # BLD: 0 K/UL (ref 0–0.01)
NRBC BLD-RTO: 0 PER 100 WBC
PH UR STRIP: 5.5 (ref 5–8)
PLATELET # BLD AUTO: 329 K/UL (ref 150–400)
PMV BLD AUTO: 10.5 FL (ref 8.9–12.9)
POTASSIUM SERPL-SCNC: 5.1 MMOL/L (ref 3.5–5.1)
PROT SERPL-MCNC: 6.2 G/DL (ref 6.4–8.3)
PROT UR STRIP-MCNC: NEGATIVE MG/DL
RBC # BLD AUTO: 4.2 M/UL (ref 3.8–5.2)
RBC #/AREA URNS HPF: ABNORMAL /HPF (ref 0–5)
SERVICE CMNT-IMP: NORMAL
SODIUM SERPL-SCNC: 140 MMOL/L (ref 136–145)
SP GR UR REFRACTOMETRY: 1.02 (ref 1–1.03)
SPECIMEN HOLD: NORMAL
T4 FREE SERPL-MCNC: 1.6 NG/DL (ref 0.9–1.6)
TSH, 3RD GENERATION: 1.98 UIU/ML (ref 0.27–4.2)
TTG IGA SER-ACNC: 7 U/ML (ref 0–3)
URINE CULTURE IF INDICATED: ABNORMAL
UROBILINOGEN UR QL STRIP.AUTO: 0.2 EU/DL (ref 0.2–1)
VIT B12 SERPL-MCNC: 922 PG/ML (ref 232–1245)
WBC # BLD AUTO: 2.8 K/UL (ref 3.6–11)
WBC URNS QL MICRO: ABNORMAL /HPF (ref 0–4)